# Patient Record
Sex: FEMALE | Race: BLACK OR AFRICAN AMERICAN | Employment: OTHER | URBAN - METROPOLITAN AREA
[De-identification: names, ages, dates, MRNs, and addresses within clinical notes are randomized per-mention and may not be internally consistent; named-entity substitution may affect disease eponyms.]

---

## 2018-09-01 ENCOUNTER — APPOINTMENT (OUTPATIENT)
Dept: CT IMAGING | Age: 53
End: 2018-09-01
Payer: MEDICARE

## 2018-09-01 ENCOUNTER — HOSPITAL ENCOUNTER (EMERGENCY)
Age: 53
Discharge: HOME | End: 2018-09-01
Attending: EMERGENCY MEDICINE
Payer: MEDICARE

## 2018-09-01 VITALS
TEMPERATURE: 97.3 F | BODY MASS INDEX: 38.98 KG/M2 | HEIGHT: 63 IN | HEART RATE: 93 BPM | SYSTOLIC BLOOD PRESSURE: 128 MMHG | WEIGHT: 220 LBS | DIASTOLIC BLOOD PRESSURE: 88 MMHG | OXYGEN SATURATION: 94 % | RESPIRATION RATE: 17 BRPM

## 2018-09-01 DIAGNOSIS — J01.10 ACUTE NON-RECURRENT FRONTAL SINUSITIS: ICD-10-CM

## 2018-09-01 DIAGNOSIS — R51.9 ACUTE NONINTRACTABLE HEADACHE, UNSPECIFIED HEADACHE TYPE: Primary | ICD-10-CM

## 2018-09-01 LAB
ABSOLUTE EOS #: 0.52 K/UL (ref 0–0.44)
ABSOLUTE IMMATURE GRANULOCYTE: <0.03 K/UL (ref 0–0.3)
ABSOLUTE LYMPH #: 2.1 K/UL (ref 1.1–3.7)
ABSOLUTE MONO #: 0.47 K/UL (ref 0.1–1.2)
ANION GAP SERPL CALCULATED.3IONS-SCNC: 13 MMOL/L (ref 9–17)
BASOPHILS # BLD: 1 % (ref 0–2)
BASOPHILS ABSOLUTE: 0.05 K/UL (ref 0–0.2)
BUN BLDV-MCNC: 15 MG/DL (ref 6–20)
BUN/CREAT BLD: ABNORMAL (ref 9–20)
CALCIUM SERPL-MCNC: 9 MG/DL (ref 8.6–10.4)
CHLORIDE BLD-SCNC: 101 MMOL/L (ref 98–107)
CO2: 24 MMOL/L (ref 20–31)
CREAT SERPL-MCNC: 0.79 MG/DL (ref 0.5–0.9)
DIFFERENTIAL TYPE: ABNORMAL
EOSINOPHILS RELATIVE PERCENT: 8 % (ref 1–4)
GFR AFRICAN AMERICAN: >60 ML/MIN
GFR NON-AFRICAN AMERICAN: >60 ML/MIN
GFR SERPL CREATININE-BSD FRML MDRD: ABNORMAL ML/MIN/{1.73_M2}
GFR SERPL CREATININE-BSD FRML MDRD: ABNORMAL ML/MIN/{1.73_M2}
GLUCOSE BLD-MCNC: 183 MG/DL (ref 70–99)
HCT VFR BLD CALC: 45.1 % (ref 36.3–47.1)
HEMOGLOBIN: 13.7 G/DL (ref 11.9–15.1)
IMMATURE GRANULOCYTES: 0 %
LYMPHOCYTES # BLD: 31 % (ref 24–43)
MCH RBC QN AUTO: 28.5 PG (ref 25.2–33.5)
MCHC RBC AUTO-ENTMCNC: 30.4 G/DL (ref 28.4–34.8)
MCV RBC AUTO: 93.8 FL (ref 82.6–102.9)
MONOCYTES # BLD: 7 % (ref 3–12)
NRBC AUTOMATED: 0 PER 100 WBC
PDW BLD-RTO: 14.3 % (ref 11.8–14.4)
PLATELET # BLD: 194 K/UL (ref 138–453)
PLATELET ESTIMATE: ABNORMAL
PMV BLD AUTO: 11.9 FL (ref 8.1–13.5)
POTASSIUM SERPL-SCNC: 4.8 MMOL/L (ref 3.7–5.3)
RBC # BLD: 4.81 M/UL (ref 3.95–5.11)
RBC # BLD: ABNORMAL 10*6/UL
SEG NEUTROPHILS: 53 % (ref 36–65)
SEGMENTED NEUTROPHILS ABSOLUTE COUNT: 3.54 K/UL (ref 1.5–8.1)
SODIUM BLD-SCNC: 138 MMOL/L (ref 135–144)
WBC # BLD: 6.7 K/UL (ref 3.5–11.3)
WBC # BLD: ABNORMAL 10*3/UL

## 2018-09-01 PROCEDURE — 70498 CT ANGIOGRAPHY NECK: CPT

## 2018-09-01 PROCEDURE — 80048 BASIC METABOLIC PNL TOTAL CA: CPT

## 2018-09-01 PROCEDURE — 99284 EMERGENCY DEPT VISIT MOD MDM: CPT

## 2018-09-01 PROCEDURE — 6360000002 HC RX W HCPCS: Performed by: STUDENT IN AN ORGANIZED HEALTH CARE EDUCATION/TRAINING PROGRAM

## 2018-09-01 PROCEDURE — 62270 DX LMBR SPI PNXR: CPT

## 2018-09-01 PROCEDURE — 6360000004 HC RX CONTRAST MEDICATION: Performed by: STUDENT IN AN ORGANIZED HEALTH CARE EDUCATION/TRAINING PROGRAM

## 2018-09-01 PROCEDURE — 70496 CT ANGIOGRAPHY HEAD: CPT

## 2018-09-01 PROCEDURE — 6360000002 HC RX W HCPCS: Performed by: EMERGENCY MEDICINE

## 2018-09-01 PROCEDURE — 85025 COMPLETE CBC W/AUTO DIFF WBC: CPT

## 2018-09-01 PROCEDURE — 2580000003 HC RX 258: Performed by: STUDENT IN AN ORGANIZED HEALTH CARE EDUCATION/TRAINING PROGRAM

## 2018-09-01 PROCEDURE — 96374 THER/PROPH/DIAG INJ IV PUSH: CPT

## 2018-09-01 PROCEDURE — 96375 TX/PRO/DX INJ NEW DRUG ADDON: CPT

## 2018-09-01 PROCEDURE — 70450 CT HEAD/BRAIN W/O DYE: CPT

## 2018-09-01 RX ORDER — DEXAMETHASONE SODIUM PHOSPHATE 4 MG/ML
4 INJECTION, SOLUTION INTRA-ARTICULAR; INTRALESIONAL; INTRAMUSCULAR; INTRAVENOUS; SOFT TISSUE ONCE
Status: COMPLETED | OUTPATIENT
Start: 2018-09-01 | End: 2018-09-01

## 2018-09-01 RX ORDER — SPIRONOLACTONE 25 MG/1
25 TABLET ORAL DAILY
COMMUNITY

## 2018-09-01 RX ORDER — ISOSORBIDE MONONITRATE 60 MG/1
60 TABLET, EXTENDED RELEASE ORAL DAILY
COMMUNITY

## 2018-09-01 RX ORDER — BENZONATATE 100 MG/1
100 CAPSULE ORAL 3 TIMES DAILY PRN
Qty: 30 CAPSULE | Refills: 0 | Status: SHIPPED | OUTPATIENT
Start: 2018-09-01 | End: 2018-09-08

## 2018-09-01 RX ORDER — 0.9 % SODIUM CHLORIDE 0.9 %
1000 INTRAVENOUS SOLUTION INTRAVENOUS ONCE
Status: COMPLETED | OUTPATIENT
Start: 2018-09-01 | End: 2018-09-01

## 2018-09-01 RX ORDER — HYDRALAZINE HYDROCHLORIDE 25 MG/1
25 TABLET, FILM COATED ORAL 3 TIMES DAILY
COMMUNITY

## 2018-09-01 RX ORDER — DIPHENHYDRAMINE HYDROCHLORIDE 50 MG/ML
25 INJECTION INTRAMUSCULAR; INTRAVENOUS ONCE
Status: COMPLETED | OUTPATIENT
Start: 2018-09-01 | End: 2018-09-01

## 2018-09-01 RX ORDER — DEXAMETHASONE SODIUM PHOSPHATE 10 MG/ML
8 INJECTION INTRAMUSCULAR; INTRAVENOUS ONCE
Status: DISCONTINUED | OUTPATIENT
Start: 2018-09-01 | End: 2018-09-01

## 2018-09-01 RX ORDER — POTASSIUM CHLORIDE 750 MG/1
10 TABLET, FILM COATED, EXTENDED RELEASE ORAL DAILY
COMMUNITY

## 2018-09-01 RX ORDER — HYDRALAZINE HYDROCHLORIDE 20 MG/ML
10 INJECTION INTRAMUSCULAR; INTRAVENOUS ONCE
Status: DISCONTINUED | OUTPATIENT
Start: 2018-09-01 | End: 2018-09-01 | Stop reason: HOSPADM

## 2018-09-01 RX ORDER — FLUTICASONE PROPIONATE 50 MCG
1 SPRAY, SUSPENSION (ML) NASAL DAILY
Qty: 1 BOTTLE | Refills: 0 | Status: SHIPPED | OUTPATIENT
Start: 2018-09-01

## 2018-09-01 RX ORDER — LABETALOL HYDROCHLORIDE 5 MG/ML
10 INJECTION, SOLUTION INTRAVENOUS ONCE
Status: DISCONTINUED | OUTPATIENT
Start: 2018-09-01 | End: 2018-09-01 | Stop reason: HOSPADM

## 2018-09-01 RX ORDER — GUAIFENESIN 600 MG/1
600 TABLET, EXTENDED RELEASE ORAL 2 TIMES DAILY
Qty: 20 TABLET | Refills: 0 | Status: SHIPPED | OUTPATIENT
Start: 2018-09-01 | End: 2020-01-16

## 2018-09-01 RX ADMIN — IOPAMIDOL 90 ML: 755 INJECTION, SOLUTION INTRAVENOUS at 17:31

## 2018-09-01 RX ADMIN — PROCHLORPERAZINE EDISYLATE 10 MG: 5 INJECTION INTRAMUSCULAR; INTRAVENOUS at 13:15

## 2018-09-01 RX ADMIN — DEXAMETHASONE SODIUM PHOSPHATE 4 MG: 4 INJECTION, SOLUTION INTRAMUSCULAR; INTRAVENOUS at 13:14

## 2018-09-01 RX ADMIN — SODIUM CHLORIDE 1000 ML: 9 INJECTION, SOLUTION INTRAVENOUS at 13:13

## 2018-09-01 RX ADMIN — DIPHENHYDRAMINE HYDROCHLORIDE 25 MG: 50 INJECTION, SOLUTION INTRAMUSCULAR; INTRAVENOUS at 13:14

## 2018-09-01 RX ADMIN — DEXAMETHASONE SODIUM PHOSPHATE 4 MG: 4 INJECTION, SOLUTION INTRAMUSCULAR; INTRAVENOUS at 13:13

## 2018-09-01 ASSESSMENT — PAIN DESCRIPTION - PROGRESSION: CLINICAL_PROGRESSION: NOT CHANGED

## 2018-09-01 ASSESSMENT — ENCOUNTER SYMPTOMS
NAUSEA: 1
SHORTNESS OF BREATH: 0
SINUS PAIN: 0
PHOTOPHOBIA: 0
VOMITING: 1
WHEEZING: 0
SORE THROAT: 0
COUGH: 1
DIARRHEA: 0
EYE PAIN: 0
ABDOMINAL PAIN: 0
CONSTIPATION: 0

## 2018-09-01 ASSESSMENT — PAIN DESCRIPTION - PAIN TYPE: TYPE: ACUTE PAIN

## 2018-09-01 ASSESSMENT — PAIN DESCRIPTION - DESCRIPTORS: DESCRIPTORS: HEADACHE

## 2018-09-01 ASSESSMENT — PAIN DESCRIPTION - LOCATION: LOCATION: HEAD

## 2018-09-01 ASSESSMENT — PAIN SCALES - GENERAL: PAINLEVEL_OUTOF10: 10

## 2018-09-01 ASSESSMENT — PAIN DESCRIPTION - ONSET: ONSET: ON-GOING

## 2018-09-01 ASSESSMENT — PAIN DESCRIPTION - FREQUENCY: FREQUENCY: CONTINUOUS

## 2018-09-01 NOTE — ED PROVIDER NOTES
5)      INITIAL VITALS:   /88   Pulse 93   Temp 97.3 °F (36.3 °C) (Oral)   Resp 17   Ht 5' 3\" (1.6 m)   Wt 220 lb (99.8 kg)   LMP 12/11/2014 (Approximate)   SpO2 94%   BMI 38.97 kg/m²     Physical Exam   Constitutional: She is oriented to person, place, and time. She appears well-developed and well-nourished. No distress. HENT:   Head: Normocephalic and atraumatic. Eyes: Pupils are equal, round, and reactive to light. Conjunctivae and EOM are normal.   Cardiovascular: Normal rate, regular rhythm, normal heart sounds and intact distal pulses. Pulmonary/Chest: Effort normal and breath sounds normal. No respiratory distress. She has no wheezes. Abdominal: Soft. Bowel sounds are normal. There is no tenderness. There is no rebound. Neurological: She is alert and oriented to person, place, and time. No cranial nerve deficit. She exhibits normal muscle tone. Coordination normal.   Skin: Skin is warm and dry. She is not diaphoretic. Psychiatric: She has a normal mood and affect. Thought content normal.   Vitals reviewed.       DIFFERENTIAL  DIAGNOSIS     PLAN (LABS / IMAGING / EKG):  Orders Placed This Encounter   Procedures    LUMBAR PUNCTURE    CT Head WO Contrast    CTA HEAD W CONTRAST    CTA NECK W CONTRAST    CBC Auto Differential    Basic Metabolic Panel    Insert peripheral IV       MEDICATIONS ORDERED:  Orders Placed This Encounter   Medications    prochlorperazine (COMPAZINE) injection 10 mg    diphenhydrAMINE (BENADRYL) injection 25 mg    DISCONTD: dexamethasone (DECADRON) injection 8 mg    0.9 % sodium chloride bolus    hydrALAZINE (APRESOLINE) injection 10 mg    dexamethasone (DECADRON) injection 4 mg    dexamethasone (DECADRON) injection 4 mg    labetalol (NORMODYNE;TRANDATE) injection 10 mg    iopamidol (ISOVUE-370) 76 % injection 90 mL       DDX: SAH, subdural hematoma, epidural, intracerebral, meningitis, encephalitis, migraine, tension, cluster, sinusitis, dental, stroke, encephalitis, abscess, CNS mass, increased ICP, venous thrombosis, carbon monoxide poisoning, acute angle closure glaucoma, temporal arteritis, idiopathic intracranial hypertension, sinusitis     DIAGNOSTIC RESULTS / EMERGENCY DEPARTMENT COURSE / MDM     LABS:  Results for orders placed or performed during the hospital encounter of 09/01/18   CBC Auto Differential   Result Value Ref Range    WBC 6.7 3.5 - 11.3 k/uL    RBC 4.81 3.95 - 5.11 m/uL    Hemoglobin 13.7 11.9 - 15.1 g/dL    Hematocrit 45.1 36.3 - 47.1 %    MCV 93.8 82.6 - 102.9 fL    MCH 28.5 25.2 - 33.5 pg    MCHC 30.4 28.4 - 34.8 g/dL    RDW 14.3 11.8 - 14.4 %    Platelets 602 591 - 914 k/uL    MPV 11.9 8.1 - 13.5 fL    NRBC Automated 0.0 0.0 per 100 WBC    Differential Type NOT REPORTED     Seg Neutrophils 53 36 - 65 %    Lymphocytes 31 24 - 43 %    Monocytes 7 3 - 12 %    Eosinophils % 8 (H) 1 - 4 %    Basophils 1 0 - 2 %    Immature Granulocytes 0 0 %    Segs Absolute 3.54 1.50 - 8.10 k/uL    Absolute Lymph # 2.10 1.10 - 3.70 k/uL    Absolute Mono # 0.47 0.10 - 1.20 k/uL    Absolute Eos # 0.52 (H) 0.00 - 0.44 k/uL    Basophils # 0.05 0.00 - 0.20 k/uL    Absolute Immature Granulocyte <0.03 0.00 - 0.30 k/uL    WBC Morphology NOT REPORTED     RBC Morphology NOT REPORTED     Platelet Estimate NOT REPORTED    Basic Metabolic Panel   Result Value Ref Range    Glucose 183 (H) 70 - 99 mg/dL    BUN 15 6 - 20 mg/dL    CREATININE 0.79 0.50 - 0.90 mg/dL    Bun/Cre Ratio NOT REPORTED 9 - 20    Calcium 9.0 8.6 - 10.4 mg/dL    Sodium 138 135 - 144 mmol/L    Potassium 4.8 3.7 - 5.3 mmol/L    Chloride 101 98 - 107 mmol/L    CO2 24 20 - 31 mmol/L    Anion Gap 13 9 - 17 mmol/L    GFR Non-African American >60 >60 mL/min    GFR African American >60 >60 mL/min    GFR Comment          GFR Staging NOT REPORTED        IMPRESSION:Patient is a 70-year-old female who presents with headache for the past 24 hours.   Patient notes that she has not had a history of headaches and states that this has been gradually getting worse over that time. Patient denies any trauma or injury. Patient was found to be hypertensive on arrival will obtain CBC, BMP, CT head give IV fluids, Decadron, Compazine and Benadryl reevaluate patient. Patient requesting discharge. Patient was given written and verbal instructions prior to discharge. Patient understood and agreed. The patient had no further questions. Pre-hypertension/Hypertension: The patient has been informed that they may have pre-hypertension or Hypertension based on a blood pressure reading in the emergency department. I recommend that the patient call the primary care provider listed on their discharge instructions or a physician of their choice this week to arrange follow up for further evaluation of possible pre-hypertension or Hypertension. Pregnancy test was not ordered because patient's postmenopausal        RADIOLOGY:  Ct Head Wo Contrast    Result Date: 9/1/2018  EXAMINATION: CT OF THE HEAD WITHOUT CONTRAST  9/1/2018 1:24 pm TECHNIQUE: CT of the head was performed without the administration of intravenous contrast. Dose modulation, iterative reconstruction, and/or weight based adjustment of the mA/kV was utilized to reduce the radiation dose to as low as reasonably achievable. COMPARISON: None. HISTORY: ORDERING SYSTEM PROVIDED HISTORY: HEADACHE TECHNOLOGIST PROVIDED HISTORY: FINDINGS: BRAIN/VENTRICLES: There is no acute intracranial hemorrhage, mass effect or midline shift. No abnormal extra-axial fluid collection. The gray-white differentiation is maintained without evidence of an acute infarct. There is no evidence of hydrocephalus. Mild chronic microvascular ischemic changes. ORBITS: The visualized portion of the orbits demonstrate no acute abnormality. SINUSES: Ethmoid, frontal and sphenoid sinus disease. There is opacification of the left mastoid air cells. No bony destruction is seen.  SOFT TISSUES/SKULL:  No normal branch pattern of the aortic arch. No significant stenosis is seen of the innominate artery or subclavian arteries. CAROTID ARTERIES: The common carotid arteries are normal in appearance. The internal carotid arteries are normal without significant stenosis or dissection evident. VERTEBRAL ARTERIES: The vertebral arteries are normal in appearance. There is no significant stenosis or dissection. SOFT TISSUES: The lung apices are clear. There is no pathologically enlarged lymphadenopathy. No soft tissue mass is identified. BONES: No lytic or blastic osseous lesions are identified. CTA HEAD: ANTERIOR CIRCULATION: The intracranial segments of the internal carotid arteries are normal.  There is no significant stenosis or aneurysm identified. The anterior and middle cerebral arteries are normal in appearance. No significant stenosis or aneurysm is identified. POSTERIOR CIRCULATION: The distal vertebral arteries are normal in appearance. The basilar artery is normal.  No significant stenosis or aneurysm is identified. The posterior cerebral arteries are normal in appearance. A fetal origin of the posterior cerebral arteries is incidentally noted. BRAIN: There is no vascular malformation or abnormal enhancement identified. 3D surface reformations were performed and concur with the above findings. Unremarkable CTA of the head and neck. EKG  None    All EKG's are interpreted by the Emergency Department Physician who either signs or Co-signs this chart in the absence of a cardiologist.    EMERGENCY DEPARTMENT COURSE:    unable to obtain CSF during LP as noted below. Patient noted after reevaluation after LP attempted that her headache has improved and is only present when she is coughing at this time. CTA head and neck ordered to evaluate for intracranial bleed and other intracranial process. If negative will discharge home with antibiotics and nasal spray.     PROCEDURES:  Lumbar Puncture  Date/Time: 9/1/2018 4:33 PM  Performed by: Clau Henriquez  Authorized by: Lazaro Robbins     Consent:     Consent obtained:  Written    Consent given by:  Patient    Risks discussed:  Bleeding, infection, nerve damage, headache, repeat procedure and pain    Alternatives discussed:  No treatment  Pre-procedure details:     Procedure purpose:  Diagnostic    Preparation: Patient was prepped and draped in usual sterile fashion    Anesthesia (see MAR for exact dosages): Anesthesia method:  Local infiltration    Local anesthetic:  Lidocaine 1% WITH epi  Procedure details:     Lumbar space:  L4-L5 interspace    Patient position:  Sitting    Needle gauge:  22    Needle type: Maddie pencil tip    Ultrasound guidance: no      Number of attempts:  4    Total volume (ml):  0  Post-procedure:     Puncture site:  Adhesive bandage applied    Patient tolerance of procedure: Tolerated well, no immediate complications (Patient tolerated the procedure however multiple attempts were performed with no CSF obtained.)  Comments:      Multiple attempts as noted above. CSF was unable to be obtained. Patient had to wait and is placed over the puncture site and will order a CTA to evaluate for subarachnoid hemorrhage. CONSULTS:  None      FINAL IMPRESSION      1. Acute nonintractable headache, unspecified headache type    2. Acute non-recurrent frontal sinusitis          DISPOSITION / PLAN     DISPOSITION Discharge    PATIENT REFERRED TO:  No follow-up provider specified.     DISCHARGE MEDICATIONS:  New Prescriptions    No medications on file       Virginie Szymanski DO  Emergency Medicine Resident    (Please note that portions of this note were completed with a voice recognition program.  Efforts were made to edit the dictations but occasionally words are mis-transcribed.)        Virginie Szymanski DO  Resident  09/01/18 4220

## 2018-09-01 NOTE — ED NOTES
Pt reports feeling \"much better\" pt given water and crackers. Denies any needs at this time.       Albina Petersen RN  09/01/18 2433

## 2018-09-01 NOTE — ED NOTES
Pt to ed from home c/o migraine starting last night 10/10 pain. Pt reports taking a motrin 800 mg this morning without relief. Pt reports frontal lobe headache with nausea.       Shanita Walls RN  09/01/18 1948

## 2018-09-01 NOTE — ED NOTES
[facility]  ED  Emergency Department Encounter  Medical Student     Pt Name: Cody Mccarthy  MRN: [id number]  [place] 1965  Date of evaluation: 9/1/18  PCP:  No primary care provider on file. CHIEF COMPLAINT       Chief Complaint   Patient presents with    Migraine     since last night. sensitive to sound and light. with nausea        HISTORY OF PRESENT ILLNESS  (Location/Symptom, Timing/Onset, Context/Setting, Quality, Duration, Modifying Factors, Severity.)      Cody Mccarthy is a 46 y.o. female with history of HTN who presents with headache that started yesterday while she was sitting on her couch. She says it came on gradually. She describes it as constant, 10/10, and frontal. She tried ibuprofen 800mg for her pain and that didn't help. She has associated nausea and vomited one time. She denies light or sound sensitivity. She denies associated fever, chills, or neck pain. PAST MEDICAL / SURGICAL / SOCIAL / FAMILY HISTORY      has a past medical history of CHF (congestive heart failure) ([facility] [address] Hypertension; NICM (nonischemic cardiomyopathy) ([facility] [address] and Noncompliance. has a past surgical history that includes Cardiac catheterization (2008). Social History     Social History    Marital status: Single     Spouse name: N/A    Number of children: 3    Years of education: N/A     Occupational History    Not on file. Social History Main Topics    Smoking status: Former Smoker     Packs/day: 1.50    Smokeless tobacco: Never Used    Alcohol use No    Drug use: No    Sexual activity: Not on file     Other Topics Concern    Not on file     Social History Narrative    No narrative on file       Family History   Problem Relation Age of Onset    Other Sister         hole in heart       Allergies:  Patient has no known allergies. Home Medications:  Prior to Admission medications    Medication Sig Start Date End Date Taking?  Authorizing Provider   hydrALAZINE (APRESOLINE) 25 MG tablet Take 25 mg by mouth 3 times daily   Yes Historical Provider, MD   potassium chloride (KLOR-CON) 10 MEQ extended release tablet Take 10 mEq by mouth daily   Yes Historical Provider, MD   spironolactone (ALDACTONE) 25 MG tablet Take 25 mg by mouth daily   Yes Historical Provider, MD   isosorbide mononitrate (IMDUR) 60 MG extended release tablet Take 60 mg by mouth daily   Yes Historical Provider, MD   aspirin 81 MG chewable tablet Take 81 mg by mouth daily. Yes Historical Provider, MD   pravastatin (PRAVACHOL) 10 MG tablet Take 10 mg by mouth daily. Yes Historical Provider, MD   carvedilol (COREG) 25 MG tablet Take 25 mg by mouth 2 times daily (with meals). Yes Historical Provider, MD   furosemide (LASIX) 40 MG tablet Take 40 mg by mouth 2 times daily. Yes Historical Provider, MD   ferrous sulfate 325 (65 FE) MG tablet Take 325 mg by mouth daily (with breakfast). Historical Provider, MD   lisinopril (PRINIVIL;ZESTRIL) 40 MG tablet Take 20 mg by mouth nightly At 8:00pm     Historical Provider, MD       REVIEW OF SYSTEMS    (2-9 systems for level 4, 10 or more for level 5)      Review of Systems   Constitutional: Negative for chills and fever. HENT: Negative for ear pain, sinus pain and sore throat. Eyes: Negative for photophobia, pain and visual disturbance. Respiratory: Negative for shortness of breath. Cardiovascular: Negative for chest pain. Gastrointestinal: Positive for nausea and vomiting. Negative for abdominal pain. Neurological: Positive for headaches. Negative for dizziness, speech difficulty, weakness, light-headedness and numbness.        PHYSICAL EXAM   (up to 7 for level 4, 8 or more for level 5)      INITIAL VITALS:   BP (!) 157/111   Pulse 93   Temp 97.3 °F (36.3 °C) (Oral)   Resp 17   Ht 5' 3\" (1.6 m)   Wt 220 lb (99.8 kg)   LMP 12/11/2014 (Approximate)   SpO2 97%   BMI 38.97 kg/m²     Physical Exam    DIFFERENTIAL  DIAGNOSIS     PLAN (LABS /

## 2019-09-24 ENCOUNTER — HOSPITAL ENCOUNTER (EMERGENCY)
Age: 54
Discharge: HOME | End: 2019-09-24
Attending: EMERGENCY MEDICINE
Payer: MEDICARE

## 2019-09-24 VITALS
DIASTOLIC BLOOD PRESSURE: 77 MMHG | HEIGHT: 63 IN | HEART RATE: 70 BPM | BODY MASS INDEX: 36.68 KG/M2 | WEIGHT: 207 LBS | RESPIRATION RATE: 16 BRPM | SYSTOLIC BLOOD PRESSURE: 114 MMHG | TEMPERATURE: 96.6 F | OXYGEN SATURATION: 98 %

## 2019-09-24 DIAGNOSIS — H57.89 EYE IRRITATION: Primary | ICD-10-CM

## 2019-09-24 DIAGNOSIS — S05.01XA ABRASION OF RIGHT CORNEA, INITIAL ENCOUNTER: ICD-10-CM

## 2019-09-24 PROCEDURE — 2500000003 HC RX 250 WO HCPCS

## 2019-09-24 PROCEDURE — 6370000000 HC RX 637 (ALT 250 FOR IP): Performed by: PHYSICIAN ASSISTANT

## 2019-09-24 PROCEDURE — 99283 EMERGENCY DEPT VISIT LOW MDM: CPT

## 2019-09-24 RX ORDER — PROPARACAINE HYDROCHLORIDE 5 MG/ML
SOLUTION/ DROPS OPHTHALMIC
Status: COMPLETED
Start: 2019-09-24 | End: 2019-09-24

## 2019-09-24 RX ORDER — PROPARACAINE HYDROCHLORIDE 5 MG/ML
1 SOLUTION/ DROPS OPHTHALMIC ONCE
Status: COMPLETED | OUTPATIENT
Start: 2019-09-24 | End: 2019-09-24

## 2019-09-24 RX ORDER — POLYMYXIN B SULFATE AND TRIMETHOPRIM 1; 10000 MG/ML; [USP'U]/ML
1 SOLUTION OPHTHALMIC EVERY 4 HOURS
Qty: 1 BOTTLE | Refills: 0 | Status: SHIPPED | OUTPATIENT
Start: 2019-09-24 | End: 2019-10-04

## 2019-09-24 RX ADMIN — PROPARACAINE HYDROCHLORIDE 1 DROP: 5 SOLUTION/ DROPS OPHTHALMIC at 15:43

## 2019-09-24 RX ADMIN — FLUORESCEIN SODIUM 1 MG: 0.6 STRIP OPHTHALMIC at 15:43

## 2019-09-24 ASSESSMENT — ENCOUNTER SYMPTOMS
SORE THROAT: 0
PHOTOPHOBIA: 0
ABDOMINAL PAIN: 0
NAUSEA: 0
BACK PAIN: 0
SHORTNESS OF BREATH: 0
EYE PAIN: 0
EYE ITCHING: 0
EYE REDNESS: 0
COLOR CHANGE: 0
VOMITING: 0
COUGH: 0
EYE DISCHARGE: 0
DIARRHEA: 0

## 2019-09-24 ASSESSMENT — PAIN DESCRIPTION - FREQUENCY: FREQUENCY: CONTINUOUS

## 2019-09-24 ASSESSMENT — PAIN DESCRIPTION - DESCRIPTORS: DESCRIPTORS: BURNING

## 2019-09-24 ASSESSMENT — VISUAL ACUITY
OS: 20/100+1
OU: 20/50-1
OD: 20/50

## 2019-09-24 ASSESSMENT — PAIN DESCRIPTION - ORIENTATION: ORIENTATION: RIGHT

## 2019-09-24 ASSESSMENT — PAIN DESCRIPTION - LOCATION: LOCATION: EYE

## 2019-09-24 ASSESSMENT — PAIN DESCRIPTION - PAIN TYPE: TYPE: ACUTE PAIN

## 2019-09-24 NOTE — ED PROVIDER NOTES
8 or more for level 5)     Vitals:    09/24/19 1541   BP: 114/77   Pulse: 70   Resp: 16   Temp: 96.6 °F (35.9 °C)   TempSrc: Oral   SpO2: 98%   Weight: 207 lb (93.9 kg)   Height: 5' 3\" (1.6 m)       Physical Exam   Constitutional: She is oriented to person, place, and time. She appears well-developed and well-nourished. No distress. HENT:   Head: Normocephalic and atraumatic. Right Ear: External ear normal.   Left Ear: External ear normal.   Nose: Nose normal.   Mouth/Throat: Oropharynx is clear and moist. No oropharyngeal exudate. Eyes: Pupils are equal, round, and reactive to light. Conjunctivae, EOM and lids are normal. Lids are everted and swept, no foreign bodies found. Right eye exhibits no chemosis, no discharge, no exudate and no hordeolum. No foreign body present in the right eye. Left eye exhibits no chemosis, no discharge, no exudate and no hordeolum. No foreign body present in the left eye. Right conjunctiva is not injected. Right conjunctiva has no hemorrhage. Left conjunctiva is not injected. Left conjunctiva has no hemorrhage. No scleral icterus. Right eye exhibits normal extraocular motion and no nystagmus. Left eye exhibits normal extraocular motion and no nystagmus. Right pupil is round and reactive. Left pupil is round and reactive. Pupils are equal.   Slit lamp exam:       The right eye shows corneal abrasion. The right eye shows no corneal flare, no corneal ulcer, no foreign body, no hyphema, no hypopyon, no fluorescein uptake and no anterior chamber bulge. Pupils are round and reactive to light. pH is normal.  Visual acuity is normal.  No Sarah sign. No eye entrapment. Mild corneal abrasion. Eye pressure is 12. No cell or flare. No fluorescein uptake. Neck: Normal range of motion. Neck supple. No meningeal signs. Cardiovascular: Normal rate, regular rhythm, normal heart sounds and intact distal pulses. Exam reveals no gallop and no friction rub.    No murmur heard.  Pulmonary/Chest: Effort normal and breath sounds normal. No stridor. No respiratory distress. She has no wheezes. She has no rales. Abdominal: Soft. Bowel sounds are normal. She exhibits no distension and no mass. There is no tenderness. There is no rebound and no guarding. No hernia. No peritoneal signs. Musculoskeletal: Normal range of motion. Neurological: She is alert and oriented to person, place, and time. No cranial nerve deficit. Skin: Skin is warm. Capillary refill takes less than 2 seconds. She is not diaphoretic. Psychiatric: She has a normal mood and affect. Her behavior is normal. Judgment and thought content normal.   Nursing note and vitals reviewed. DIAGNOSTIC RESULTS       No orders to display         LABS:  Labs Reviewed - No data to display        EMERGENCY DEPARTMENT COURSE and DIFFERENTIAL DIAGNOSIS/MDM:   Vitals:    Vitals:    09/24/19 1541   BP: 114/77   Pulse: 70   Resp: 16   Temp: 96.6 °F (35.9 °C)   TempSrc: Oral   SpO2: 98%   Weight: 207 lb (93.9 kg)   Height: 5' 3\" (1.6 m)       This is a 66-year-old female presenting to the emergency department complaining of right eye irritation. We will flush the eye per patients request.  While we were evaluating with the slit-lamp we did notice a small corneal abrasion and the proparacaine did relieve the patient's symptoms. We will place her on Polytrim at this time and she was told to take this as prescribed and as directed and all the way through to completion and follow-up with ophthalmology in 3 days. She should return for any worsening symptoms. Vital signs are stable and there is no red flag symptoms. Patient understood and will comply. Patient is satisfied. All questions answered. Attending physician, myself, and patient agree no further workup is necessary at this time. Patient was given very strict return protocols and is completely agreeable with this plan.       This patient was seen by the attending

## 2019-09-24 NOTE — ED NOTES
Patient to ed with c/o foreign body sensation and burning pain to right eye, worse when her eye lid is shut. Patient denies any injury to right eye. Patient denies any diabetic retinopathy or glaucoma. Patient denies any visual disturbances. Patient states she has been seen at two hospitals and sent home on ATB eye drops that she has been using. Patient states she had this pain once before and it went away after eye was flushed here in ER.       Leena Longoria RN  09/24/19 9449

## 2020-01-16 ENCOUNTER — HOSPITAL ENCOUNTER (INPATIENT)
Age: 55
LOS: 2 days | Discharge: HOME | DRG: 293 | End: 2020-01-18
Attending: EMERGENCY MEDICINE | Admitting: INTERNAL MEDICINE
Payer: MEDICARE

## 2020-01-16 ENCOUNTER — APPOINTMENT (OUTPATIENT)
Dept: GENERAL RADIOLOGY | Age: 55
DRG: 293 | End: 2020-01-16
Payer: MEDICARE

## 2020-01-16 ENCOUNTER — APPOINTMENT (OUTPATIENT)
Dept: CT IMAGING | Age: 55
DRG: 293 | End: 2020-01-16
Payer: MEDICARE

## 2020-01-16 PROBLEM — I50.1 PULMONARY EDEMA CARDIAC CAUSE (HCC): Status: ACTIVE | Noted: 2020-01-16

## 2020-01-16 LAB
ABSOLUTE EOS #: 0.1 K/UL (ref 0–0.4)
ABSOLUTE IMMATURE GRANULOCYTE: ABNORMAL K/UL (ref 0–0.3)
ABSOLUTE LYMPH #: 2.4 K/UL (ref 1–4.8)
ABSOLUTE MONO #: 0.7 K/UL (ref 0.1–1.3)
ANION GAP SERPL CALCULATED.3IONS-SCNC: 16 MMOL/L (ref 9–17)
BASOPHILS # BLD: 2 % (ref 0–2)
BASOPHILS ABSOLUTE: 0.1 K/UL (ref 0–0.2)
BNP INTERPRETATION: ABNORMAL
BUN BLDV-MCNC: 11 MG/DL (ref 6–20)
BUN/CREAT BLD: ABNORMAL (ref 9–20)
CALCIUM SERPL-MCNC: 9.2 MG/DL (ref 8.6–10.4)
CHLORIDE BLD-SCNC: 102 MMOL/L (ref 98–107)
CO2: 22 MMOL/L (ref 20–31)
CREAT SERPL-MCNC: 0.87 MG/DL (ref 0.5–0.9)
D-DIMER QUANTITATIVE: 1.21 MG/L FEU (ref 0–0.59)
DIFFERENTIAL TYPE: ABNORMAL
EOSINOPHILS RELATIVE PERCENT: 2 % (ref 0–4)
GFR AFRICAN AMERICAN: >60 ML/MIN
GFR NON-AFRICAN AMERICAN: >60 ML/MIN
GFR SERPL CREATININE-BSD FRML MDRD: ABNORMAL ML/MIN/{1.73_M2}
GFR SERPL CREATININE-BSD FRML MDRD: ABNORMAL ML/MIN/{1.73_M2}
GLUCOSE BLD-MCNC: 158 MG/DL (ref 70–99)
HCT VFR BLD CALC: 45.3 % (ref 36–46)
HEMOGLOBIN: 14.5 G/DL (ref 12–16)
IMMATURE GRANULOCYTES: ABNORMAL %
LYMPHOCYTES # BLD: 33 % (ref 24–44)
MCH RBC QN AUTO: 28.6 PG (ref 26–34)
MCHC RBC AUTO-ENTMCNC: 32.1 G/DL (ref 31–37)
MCV RBC AUTO: 89 FL (ref 80–100)
MONOCYTES # BLD: 9 % (ref 1–7)
NRBC AUTOMATED: ABNORMAL PER 100 WBC
PDW BLD-RTO: 14.7 % (ref 11.5–14.9)
PLATELET # BLD: 279 K/UL (ref 150–450)
PLATELET ESTIMATE: ABNORMAL
PMV BLD AUTO: 9.2 FL (ref 6–12)
POTASSIUM SERPL-SCNC: 3.8 MMOL/L (ref 3.7–5.3)
PRO-BNP: 3556 PG/ML
RBC # BLD: 5.09 M/UL (ref 4–5.2)
RBC # BLD: ABNORMAL 10*6/UL
SEG NEUTROPHILS: 54 % (ref 36–66)
SEGMENTED NEUTROPHILS ABSOLUTE COUNT: 3.9 K/UL (ref 1.3–9.1)
SODIUM BLD-SCNC: 140 MMOL/L (ref 135–144)
TROPONIN INTERP: NORMAL
TROPONIN INTERP: NORMAL
TROPONIN T: NORMAL NG/ML
TROPONIN T: NORMAL NG/ML
TROPONIN, HIGH SENSITIVITY: 10 NG/L (ref 0–14)
TROPONIN, HIGH SENSITIVITY: 11 NG/L (ref 0–14)
WBC # BLD: 7.2 K/UL (ref 3.5–11)
WBC # BLD: ABNORMAL 10*3/UL

## 2020-01-16 PROCEDURE — 2580000003 HC RX 258: Performed by: INTERNAL MEDICINE

## 2020-01-16 PROCEDURE — 80048 BASIC METABOLIC PNL TOTAL CA: CPT

## 2020-01-16 PROCEDURE — 85025 COMPLETE CBC W/AUTO DIFF WBC: CPT

## 2020-01-16 PROCEDURE — 6360000004 HC RX CONTRAST MEDICATION: Performed by: EMERGENCY MEDICINE

## 2020-01-16 PROCEDURE — 93005 ELECTROCARDIOGRAM TRACING: CPT | Performed by: EMERGENCY MEDICINE

## 2020-01-16 PROCEDURE — 85379 FIBRIN DEGRADATION QUANT: CPT

## 2020-01-16 PROCEDURE — 96365 THER/PROPH/DIAG IV INF INIT: CPT

## 2020-01-16 PROCEDURE — 87804 INFLUENZA ASSAY W/OPTIC: CPT

## 2020-01-16 PROCEDURE — 84484 ASSAY OF TROPONIN QUANT: CPT

## 2020-01-16 PROCEDURE — 2060000000 HC ICU INTERMEDIATE R&B

## 2020-01-16 PROCEDURE — 96375 TX/PRO/DX INJ NEW DRUG ADDON: CPT

## 2020-01-16 PROCEDURE — 99285 EMERGENCY DEPT VISIT HI MDM: CPT

## 2020-01-16 PROCEDURE — 6370000000 HC RX 637 (ALT 250 FOR IP): Performed by: EMERGENCY MEDICINE

## 2020-01-16 PROCEDURE — 71260 CT THORAX DX C+: CPT

## 2020-01-16 PROCEDURE — 70450 CT HEAD/BRAIN W/O DYE: CPT

## 2020-01-16 PROCEDURE — 71046 X-RAY EXAM CHEST 2 VIEWS: CPT

## 2020-01-16 PROCEDURE — 2580000003 HC RX 258: Performed by: EMERGENCY MEDICINE

## 2020-01-16 PROCEDURE — 36415 COLL VENOUS BLD VENIPUNCTURE: CPT

## 2020-01-16 PROCEDURE — 83880 ASSAY OF NATRIURETIC PEPTIDE: CPT

## 2020-01-16 PROCEDURE — 6360000002 HC RX W HCPCS: Performed by: EMERGENCY MEDICINE

## 2020-01-16 RX ORDER — SODIUM CHLORIDE 0.9 % (FLUSH) 0.9 %
10 SYRINGE (ML) INJECTION PRN
Status: DISCONTINUED | OUTPATIENT
Start: 2020-01-16 | End: 2020-01-18 | Stop reason: HOSPADM

## 2020-01-16 RX ORDER — PRAVASTATIN SODIUM 10 MG
10 TABLET ORAL DAILY
Status: DISCONTINUED | OUTPATIENT
Start: 2020-01-17 | End: 2020-01-18 | Stop reason: HOSPADM

## 2020-01-16 RX ORDER — SPIRONOLACTONE 25 MG/1
25 TABLET ORAL DAILY
Status: DISCONTINUED | OUTPATIENT
Start: 2020-01-17 | End: 2020-01-18 | Stop reason: HOSPADM

## 2020-01-16 RX ORDER — NITROGLYCERIN 0.4 MG/1
0.4 TABLET SUBLINGUAL EVERY 5 MIN PRN
Status: DISCONTINUED | OUTPATIENT
Start: 2020-01-16 | End: 2020-01-18 | Stop reason: HOSPADM

## 2020-01-16 RX ORDER — ONDANSETRON 2 MG/ML
4 INJECTION INTRAMUSCULAR; INTRAVENOUS EVERY 6 HOURS PRN
Status: DISCONTINUED | OUTPATIENT
Start: 2020-01-16 | End: 2020-01-18 | Stop reason: HOSPADM

## 2020-01-16 RX ORDER — ONDANSETRON 2 MG/ML
4 INJECTION INTRAMUSCULAR; INTRAVENOUS ONCE
Status: COMPLETED | OUTPATIENT
Start: 2020-01-16 | End: 2020-01-16

## 2020-01-16 RX ORDER — FERROUS SULFATE 325(65) MG
325 TABLET ORAL
Status: DISCONTINUED | OUTPATIENT
Start: 2020-01-17 | End: 2020-01-18 | Stop reason: HOSPADM

## 2020-01-16 RX ORDER — FUROSEMIDE 10 MG/ML
40 INJECTION INTRAMUSCULAR; INTRAVENOUS ONCE
Status: COMPLETED | OUTPATIENT
Start: 2020-01-16 | End: 2020-01-16

## 2020-01-16 RX ORDER — POTASSIUM CHLORIDE 750 MG/1
10 CAPSULE, EXTENDED RELEASE ORAL DAILY
Status: DISCONTINUED | OUTPATIENT
Start: 2020-01-17 | End: 2020-01-18 | Stop reason: HOSPADM

## 2020-01-16 RX ORDER — CARVEDILOL 25 MG/1
25 TABLET ORAL 2 TIMES DAILY WITH MEALS
Status: DISCONTINUED | OUTPATIENT
Start: 2020-01-17 | End: 2020-01-18 | Stop reason: HOSPADM

## 2020-01-16 RX ORDER — SODIUM CHLORIDE 0.9 % (FLUSH) 0.9 %
10 SYRINGE (ML) INJECTION EVERY 12 HOURS SCHEDULED
Status: DISCONTINUED | OUTPATIENT
Start: 2020-01-16 | End: 2020-01-18 | Stop reason: HOSPADM

## 2020-01-16 RX ORDER — FUROSEMIDE 10 MG/ML
40 INJECTION INTRAMUSCULAR; INTRAVENOUS DAILY
Status: DISCONTINUED | OUTPATIENT
Start: 2020-01-17 | End: 2020-01-18 | Stop reason: HOSPADM

## 2020-01-16 RX ORDER — NITROGLYCERIN 20 MG/100ML
5 INJECTION INTRAVENOUS CONTINUOUS
Status: DISCONTINUED | OUTPATIENT
Start: 2020-01-16 | End: 2020-01-18 | Stop reason: HOSPADM

## 2020-01-16 RX ORDER — 0.9 % SODIUM CHLORIDE 0.9 %
80 INTRAVENOUS SOLUTION INTRAVENOUS ONCE
Status: COMPLETED | OUTPATIENT
Start: 2020-01-16 | End: 2020-01-16

## 2020-01-16 RX ORDER — HYDRALAZINE HYDROCHLORIDE 25 MG/1
25 TABLET, FILM COATED ORAL 3 TIMES DAILY
Status: DISCONTINUED | OUTPATIENT
Start: 2020-01-17 | End: 2020-01-18 | Stop reason: HOSPADM

## 2020-01-16 RX ORDER — ISOSORBIDE MONONITRATE 60 MG/1
60 TABLET, EXTENDED RELEASE ORAL DAILY
Status: DISCONTINUED | OUTPATIENT
Start: 2020-01-17 | End: 2020-01-18 | Stop reason: HOSPADM

## 2020-01-16 RX ORDER — FLUTICASONE PROPIONATE 50 MCG
1 SPRAY, SUSPENSION (ML) NASAL DAILY
Status: DISCONTINUED | OUTPATIENT
Start: 2020-01-17 | End: 2020-01-18 | Stop reason: HOSPADM

## 2020-01-16 RX ORDER — MORPHINE SULFATE 4 MG/ML
4 INJECTION, SOLUTION INTRAMUSCULAR; INTRAVENOUS ONCE
Status: COMPLETED | OUTPATIENT
Start: 2020-01-16 | End: 2020-01-16

## 2020-01-16 RX ORDER — ASPIRIN 81 MG/1
324 TABLET, CHEWABLE ORAL ONCE
Status: COMPLETED | OUTPATIENT
Start: 2020-01-16 | End: 2020-01-16

## 2020-01-16 RX ORDER — ASPIRIN 81 MG/1
162 TABLET, CHEWABLE ORAL DAILY
Status: DISCONTINUED | OUTPATIENT
Start: 2020-01-17 | End: 2020-01-18 | Stop reason: HOSPADM

## 2020-01-16 RX ADMIN — SODIUM CHLORIDE 80 ML: 9 INJECTION, SOLUTION INTRAVENOUS at 19:19

## 2020-01-16 RX ADMIN — ASPIRIN 81 MG 324 MG: 81 TABLET ORAL at 18:38

## 2020-01-16 RX ADMIN — CEFTRIAXONE SODIUM 1 G: 1 INJECTION, POWDER, FOR SOLUTION INTRAMUSCULAR; INTRAVENOUS at 20:11

## 2020-01-16 RX ADMIN — ONDANSETRON 4 MG: 2 INJECTION INTRAMUSCULAR; INTRAVENOUS at 18:39

## 2020-01-16 RX ADMIN — MORPHINE SULFATE 4 MG: 4 INJECTION, SOLUTION INTRAMUSCULAR; INTRAVENOUS at 18:39

## 2020-01-16 RX ADMIN — NITROGLYCERIN 0.4 MG: 0.4 TABLET SUBLINGUAL at 18:42

## 2020-01-16 RX ADMIN — AZITHROMYCIN MONOHYDRATE 500 MG: 500 INJECTION, POWDER, LYOPHILIZED, FOR SOLUTION INTRAVENOUS at 21:13

## 2020-01-16 RX ADMIN — IOPAMIDOL 75 ML: 755 INJECTION, SOLUTION INTRAVENOUS at 19:18

## 2020-01-16 RX ADMIN — Medication 10 ML: at 19:19

## 2020-01-16 RX ADMIN — Medication 10 ML: at 22:43

## 2020-01-16 RX ADMIN — FUROSEMIDE 40 MG: 10 INJECTION, SOLUTION INTRAMUSCULAR; INTRAVENOUS at 20:11

## 2020-01-16 ASSESSMENT — PAIN SCALES - GENERAL
PAINLEVEL_OUTOF10: 10
PAINLEVEL_OUTOF10: 10
PAINLEVEL_OUTOF10: 2
PAINLEVEL_OUTOF10: 2

## 2020-01-16 ASSESSMENT — ENCOUNTER SYMPTOMS
NAUSEA: 0
COUGH: 0
SORE THROAT: 0
BLOOD IN STOOL: 0
DIARRHEA: 0
VOMITING: 0
ABDOMINAL PAIN: 0
SHORTNESS OF BREATH: 1
COLOR CHANGE: 0
TROUBLE SWALLOWING: 0
BACK PAIN: 0
CONSTIPATION: 0

## 2020-01-16 ASSESSMENT — PAIN DESCRIPTION - LOCATION
LOCATION: HEAD
LOCATION: HEAD

## 2020-01-16 ASSESSMENT — PAIN - FUNCTIONAL ASSESSMENT
PAIN_FUNCTIONAL_ASSESSMENT: ACTIVITIES ARE NOT PREVENTED
PAIN_FUNCTIONAL_ASSESSMENT: ACTIVITIES ARE NOT PREVENTED

## 2020-01-16 NOTE — ED PROVIDER NOTES
[address] ED  eMERGENCY dEPARTMENT eNCOUnter    Pt Name: Jimenez Jauregui  MRN: [id number]  YOB: 1965  Date of evaluation:1/16/20  PCP: Dheeraj Zhang, [facility] [address]       Chief Complaint   Patient presents with    Chest Pain    Headache       HISTORY OF PRESENT ILLNESS    Jimenez Jauregui is a 47 y.o. female who presents with a chief complaint of chest pain and a headache. Patient states that yesterday evening she developed chest pain that is mostly on the left side. It is 10 out of 10 and feels like it is aching. It is worse with exertion. Also feels mildly short of breath but states she has congestive heart failure and does not think it is any significantly worse than usual.  Also developed a frontal headache around that time 2. No blurred or double vision. No cough, congestion, recent illnesses. No fevers that she knows of. No nausea, vomiting, changes in bowel or bladder habits. States she has been taking all medications as prescribed including her antihypertensives. Symptoms are acute. Symptoms are severe. Nothing else make symptoms better or worse. Patient has no other complaints at this time. REVIEW OF SYSTEMS       Review of Systems   Constitutional: Negative for chills, fatigue and fever. HENT: Negative for congestion, ear pain, sore throat and trouble swallowing. Eyes: Negative for visual disturbance. Respiratory: Positive for shortness of breath. Negative for cough. Cardiovascular: Positive for chest pain. Negative for palpitations and leg swelling. Gastrointestinal: Negative for abdominal pain, blood in stool, constipation, diarrhea, nausea and vomiting. Genitourinary: Negative for dysuria and flank pain. Musculoskeletal: Negative for arthralgias, back pain, myalgias and neck pain. Skin: Negative for color change, rash and wound. Neurological: Positive for headaches. Negative for dizziness, weakness, light-headedness and numbness. TempSrc:   Oral    SpO2: 98% 97% 94% 98%   Weight:       Height:         7:47 PM  Patient's initial troponin is negative. D-dimer elevated so I did get CT scan the chest.  Patient does have evidence of pulmonary edema. She is not any respiratory distress at this time however on reevaluation her oxygen saturation was 82 to 83% with a good waveform. Again not in any respiratory distress, not tachypneic, protecting her airway. I did place the patient on 4 L of nasal cannula oxygen at this time. I think she probably has a congestive heart failure exacerbation. She does have a BNP elevated to 3500. I spoke about the case with Dr. Lynwood Dancer covering for her cardiologist Dr. Beverly Eduardo. He suggested following up on her second troponin and if it is not elevated admitting her here with a consult to Dr. Kobi Nichole. I am also going to start the patient on a nitroglycerin infusion, give 1 dose of IV Lasix at this time. Patient still confirms that she has been taking her antihypertensives and her diuretics as prescribed. 8:40 PM  Patient's chest pain actually improved after sublingual nitroglycerin. We did not need to start nitroglycerin infusion. Blood pressure now 137/104.      9:37 PM  Second troponin is not elevated. Patient is okay for admission for CHF exacerbation. I spoke with Dr. Farhana Dover patient for admission. Patient does have bronchial thickening on her CT scan. No obvious pneumonia however with her past medical history, desaturation here I am going to start antibiotics to cover for community-acquired pneumonia. CRITICALCARE:      CONSULTS:  IP CONSULT TO CARDIOLOGY  IP CONSULT TO INTERNAL MEDICINE  IP CONSULT TO CARDIOLOGY      PROCEDURES:      FINAL IMPRESSION      1. Acute on chronic congestive heart failure, unspecified heart failure type ([address]    2. Bronchitis    3.  Chest pain, unspecified type            DISPOSITION/PLAN   DISPOSITION      Admission      PATIENT REFERRED TO:  Jazmine Conway

## 2020-01-17 LAB
ABSOLUTE EOS #: 0.1 K/UL (ref 0–0.4)
ABSOLUTE IMMATURE GRANULOCYTE: ABNORMAL K/UL (ref 0–0.3)
ABSOLUTE LYMPH #: 1.1 K/UL (ref 1–4.8)
ABSOLUTE MONO #: 0.5 K/UL (ref 0.1–1.3)
ANION GAP SERPL CALCULATED.3IONS-SCNC: 10 MMOL/L (ref 9–17)
BASOPHILS # BLD: 0 % (ref 0–2)
BASOPHILS ABSOLUTE: 0 K/UL (ref 0–0.2)
BUN BLDV-MCNC: 11 MG/DL (ref 6–20)
BUN/CREAT BLD: ABNORMAL (ref 9–20)
CALCIUM SERPL-MCNC: 9 MG/DL (ref 8.6–10.4)
CHLORIDE BLD-SCNC: 103 MMOL/L (ref 98–107)
CO2: 27 MMOL/L (ref 20–31)
CREAT SERPL-MCNC: 0.77 MG/DL (ref 0.5–0.9)
DIFFERENTIAL TYPE: ABNORMAL
EKG ATRIAL RATE: 99 BPM
EKG P AXIS: 75 DEGREES
EKG P-R INTERVAL: 160 MS
EKG Q-T INTERVAL: 396 MS
EKG QRS DURATION: 118 MS
EKG QTC CALCULATION (BAZETT): 508 MS
EKG R AXIS: -20 DEGREES
EKG T AXIS: 90 DEGREES
EKG VENTRICULAR RATE: 99 BPM
EOSINOPHILS RELATIVE PERCENT: 1 % (ref 0–4)
GFR AFRICAN AMERICAN: >60 ML/MIN
GFR NON-AFRICAN AMERICAN: >60 ML/MIN
GFR SERPL CREATININE-BSD FRML MDRD: ABNORMAL ML/MIN/{1.73_M2}
GFR SERPL CREATININE-BSD FRML MDRD: ABNORMAL ML/MIN/{1.73_M2}
GLUCOSE BLD-MCNC: 106 MG/DL (ref 70–99)
HCT VFR BLD CALC: 40.9 % (ref 36–46)
HEMOGLOBIN: 13.4 G/DL (ref 12–16)
IMMATURE GRANULOCYTES: ABNORMAL %
LYMPHOCYTES # BLD: 21 % (ref 24–44)
MCH RBC QN AUTO: 29 PG (ref 26–34)
MCHC RBC AUTO-ENTMCNC: 32.7 G/DL (ref 31–37)
MCV RBC AUTO: 89 FL (ref 80–100)
MONOCYTES # BLD: 9 % (ref 1–7)
NRBC AUTOMATED: ABNORMAL PER 100 WBC
PDW BLD-RTO: 14.6 % (ref 11.5–14.9)
PLATELET # BLD: 214 K/UL (ref 150–450)
PLATELET ESTIMATE: ABNORMAL
PMV BLD AUTO: 8.9 FL (ref 6–12)
POTASSIUM SERPL-SCNC: 3.9 MMOL/L (ref 3.7–5.3)
RBC # BLD: 4.6 M/UL (ref 4–5.2)
RBC # BLD: ABNORMAL 10*6/UL
SEG NEUTROPHILS: 69 % (ref 36–66)
SEGMENTED NEUTROPHILS ABSOLUTE COUNT: 3.6 K/UL (ref 1.3–9.1)
SODIUM BLD-SCNC: 140 MMOL/L (ref 135–144)
WBC # BLD: 5.3 K/UL (ref 3.5–11)
WBC # BLD: ABNORMAL 10*3/UL

## 2020-01-17 PROCEDURE — 85025 COMPLETE CBC W/AUTO DIFF WBC: CPT

## 2020-01-17 PROCEDURE — 36415 COLL VENOUS BLD VENIPUNCTURE: CPT

## 2020-01-17 PROCEDURE — 6360000002 HC RX W HCPCS: Performed by: INTERNAL MEDICINE

## 2020-01-17 PROCEDURE — 2060000000 HC ICU INTERMEDIATE R&B

## 2020-01-17 PROCEDURE — 2580000003 HC RX 258: Performed by: INTERNAL MEDICINE

## 2020-01-17 PROCEDURE — 80048 BASIC METABOLIC PNL TOTAL CA: CPT

## 2020-01-17 PROCEDURE — 6370000000 HC RX 637 (ALT 250 FOR IP): Performed by: INTERNAL MEDICINE

## 2020-01-17 RX ORDER — ACETAMINOPHEN 325 MG/1
650 TABLET ORAL EVERY 6 HOURS PRN
Status: DISCONTINUED | OUTPATIENT
Start: 2020-01-17 | End: 2020-01-18 | Stop reason: HOSPADM

## 2020-01-17 RX ADMIN — ENOXAPARIN SODIUM 40 MG: 40 INJECTION SUBCUTANEOUS at 09:09

## 2020-01-17 RX ADMIN — PRAVASTATIN SODIUM 10 MG: 10 TABLET ORAL at 09:10

## 2020-01-17 RX ADMIN — SACUBITRIL AND VALSARTAN 1 TABLET: 49; 51 TABLET, FILM COATED ORAL at 09:15

## 2020-01-17 RX ADMIN — SACUBITRIL AND VALSARTAN 1 TABLET: 49; 51 TABLET, FILM COATED ORAL at 20:35

## 2020-01-17 RX ADMIN — ASPIRIN 81 MG 162 MG: 81 TABLET ORAL at 09:10

## 2020-01-17 RX ADMIN — CEFTRIAXONE SODIUM 1 G: 1 INJECTION, POWDER, FOR SOLUTION INTRAMUSCULAR; INTRAVENOUS at 20:36

## 2020-01-17 RX ADMIN — SPIRONOLACTONE 25 MG: 25 TABLET, FILM COATED ORAL at 09:10

## 2020-01-17 RX ADMIN — Medication 10 ML: at 21:00

## 2020-01-17 RX ADMIN — Medication 10 ML: at 09:19

## 2020-01-17 RX ADMIN — ACETAMINOPHEN 650 MG: 325 TABLET, FILM COATED ORAL at 16:36

## 2020-01-17 RX ADMIN — HYDRALAZINE HYDROCHLORIDE 25 MG: 25 TABLET, FILM COATED ORAL at 14:51

## 2020-01-17 RX ADMIN — POTASSIUM CHLORIDE 10 MEQ: 10 CAPSULE, COATED, EXTENDED RELEASE ORAL at 09:10

## 2020-01-17 RX ADMIN — FLUTICASONE PROPIONATE 1 SPRAY: 50 SPRAY, METERED NASAL at 09:16

## 2020-01-17 RX ADMIN — HYDRALAZINE HYDROCHLORIDE 25 MG: 25 TABLET, FILM COATED ORAL at 09:10

## 2020-01-17 RX ADMIN — CARVEDILOL 25 MG: 25 TABLET, FILM COATED ORAL at 16:41

## 2020-01-17 RX ADMIN — FUROSEMIDE 40 MG: 10 INJECTION, SOLUTION INTRAMUSCULAR; INTRAVENOUS at 09:10

## 2020-01-17 RX ADMIN — CARVEDILOL 25 MG: 25 TABLET, FILM COATED ORAL at 09:10

## 2020-01-17 RX ADMIN — FERROUS SULFATE TAB 325 MG (65 MG ELEMENTAL FE) 325 MG: 325 (65 FE) TAB at 09:10

## 2020-01-17 RX ADMIN — ISOSORBIDE MONONITRATE 60 MG: 60 TABLET, EXTENDED RELEASE ORAL at 09:10

## 2020-01-17 RX ADMIN — HYDRALAZINE HYDROCHLORIDE 25 MG: 25 TABLET, FILM COATED ORAL at 20:35

## 2020-01-17 ASSESSMENT — PAIN SCALES - GENERAL: PAINLEVEL_OUTOF10: 6

## 2020-01-17 NOTE — CARE COORDINATION
CASE MANAGEMENT NOTE:    Admission Date:  1/16/2020 Alden Noriega is a 47 y.o.  female    Admitted for : No admission diagnoses are documented for this encounter. Met with:  Patient    PCP:  Ct Hassan CNP                                Insurance:  [employer]      Current Residence/ Living Arrangements:  independently at home     With son        Current Services PTA:  No    Is patient agreeable to VNS: No    Freedom of choice provided: NA    List of [address] provided: No    VNS chosen:  No    DME:  none    Home Oxygen: No    Nebulizer: No    CPAP/BIPAP: No    Supplier: N/A    Potential Assistance Needed: not expected    SNF needed: No    Pharmacy:  [address] [place] [address]       Is the Patient an VasoNova with Readmission Risk Score greater than 14%? No  If yes, pt needs a follow up appointment made within 7 days. Does Patient want to use MEDS to BEDS? Yes    Family Members/Caregivers that pt would like involved in their care:    Yes    If yes, list name here:  Sister Sherman Selby  Daughter [facility]    Transportation Provider:  Patient    And family         Is patient in Isolation/One on One/Altered Mental Status? No  If yes, skip next question. If no, would they like an I-Pad to  use? No  If yes, call [phone]. Discharge Plan: 1/16/2020  [employer]    DME: none  Pt lives independently at home and drives or has sister pick her up. Pt is interested in Meds to Beds. Pt expects to return home without needs.                Electronically signed by: Rosalee Ulloa RN on 1/16/2020 at 8:49 PM

## 2020-01-17 NOTE — FLOWSHEET NOTE
Patient stated, \"I have a strong sarah and know how he can heal. God is good. \"  As Jessenia Lazaro offered prayer patient offered a blessing. 01/17/20 1413   Encounter Summary   Services provided to: Patient   Referral/Consult From: [facility]   (1-17-20)   Complexity of Encounter Low   Length of Encounter 15 minutes   Spiritual Assessment Completed Yes   Spiritual/Yarsanism   Type Spiritual support   Assessment Calm; Approachable; Hopeful   Intervention Active listening;Prayer;Sustaining presence/ Ministry of presence   Outcome Connection/belonging;Expressed gratitude

## 2020-01-17 NOTE — PROGRESS NOTES
Mimi Sorto is a 47 y.o. female patient.     Current Facility-Administered Medications   Medication Dose Route Frequency Provider Last Rate Last Dose    nitroGLYCERIN (NITROSTAT) SL tablet 0.4 mg  0.4 mg Sublingual Q5 Min PRN [place] Beatriz, DO   0.4 mg at 01/16/20 1842    sodium chloride flush 0.9 % injection 10 mL  10 mL Intravenous PRN [place] Beatriz, DO   10 mL at 01/16/20 1919    nitroGLYCERIN 50 mg in dextrose 5% 250 mL infusion  5 mcg/min Intravenous Continuous Sandi Beatriz, DO   Stopped at 01/16/20 2028    sodium chloride flush 0.9 % injection 10 mL  10 mL Intravenous 2 times per day Lew Vidal MD   10 mL at 01/16/20 2243    sodium chloride flush 0.9 % injection 10 mL  10 mL Intravenous PRN Lew Vidal MD        magnesium hydroxide (MILK OF MAGNESIA) 400 MG/5ML suspension 30 mL  30 mL Oral Daily PRN Lew Vidal MD        ondansetron (ZOFRAN) injection 4 mg  4 mg Intravenous Q6H PRN Lew Vidal MD        enoxaparin (LOVENOX) injection 40 mg  40 mg Subcutaneous Daily Lew Vidal MD        aspirin chewable tablet 162 mg  162 mg Oral Daily Lew Vidal MD        carvedilol (COREG) tablet 25 mg  25 mg Oral BID WC Lew Vidal MD        ferrous sulfate tablet 325 mg  325 mg Oral Daily with breakfast Lew Vidal MD        fluticasone (FLONASE) 50 MCG/ACT nasal spray 1 spray  1 spray Nasal Daily Lew Vidal MD        hydrALAZINE (APRESOLINE) tablet 25 mg  25 mg Oral TID Lew Vidal MD        isosorbide mononitrate (IMDUR) extended release tablet 60 mg  60 mg Oral Daily Lew Vidal MD        potassium chloride (MICRO-K) extended release capsule 10 mEq  10 mEq Oral Daily Lew Vidal MD        pravastatin (PRAVACHOL) tablet 10 mg  10 mg Oral Daily Venuglolis Arcos MD        sacubitril-valsartan (ENTRESTO) 49-51 MG per tablet 1 tablet  1 tablet Oral BID Lew Vidal MD        spironolactone

## 2020-01-17 NOTE — ED NOTES
Pt refusing flu swab at this time. Dr Libia Christian notified. Pt also reporting she is chest pain free and feeling much better. Nitro drip held. Dr Libia Christian notified.       Jorgito Chavez RN  01/16/20 2039

## 2020-01-17 NOTE — CARE COORDINATION
ONGOING DISCHARGE PLAN:    Spoke with patient regarding discharge plan and patient confirms that plan is still to go home with no needs. Wants meds to beds, however if not discharging before 2;00p.m. Saturday will need to change this to [facility]. Remains on Iv Zithromax, Iv rocephin,     Will continue to follow for additional discharge needs.     Electronically signed by Agata Bernard RN on 1/17/2020 at 1:47 PM

## 2020-01-17 NOTE — ED NOTES
Report given to Ghassan Lovelace RN from ER. Report method in person   The following was reviewed with receiving RN:   Current vital signs:  BP (!) 145/98   Pulse 99   Resp 16   Ht 5' 3\" (1.6 m)   Wt 204 lb (92.5 kg)   LMP 12/11/2014 (Approximate)   SpO2 91%   BMI 36.14 kg/m²                      Any medication or safety alerts were reviewed. Any pending diagnostics and notifications were also reviewed, as well as any safety concerns or issues, abnormal labs, abnormal imaging, and abnormal assessment findings. Questions were answered.            Clifton Santacruz RN  01/16/20 8961

## 2020-01-17 NOTE — CONSULTS
Date:   1/17/2020  Patient name: Jimenez Jauregui  Date of admission:  1/16/2020  6:11 PM  MRN:   [id number]  YOB: 1965  PCP: VIVIEN Brooks CNP    Reason for Admission: Pulmonary edema cardiac cause [facility]) [I50.1]    Cardiology consult: Congestive heart failure, accelerated hypertension   Impression    Accelerated hypertension blood pressure 180/120  Hypertension with hypertensive heart disease  Nonischemic cardiomyopathy ejection fraction less than 20%  Status post AICD placement  Overweight  No history of TIA or CVA      History of presenting illness: 19-year-old female with a past medical history of nonischemic cardiomyopathy, hypertension with hypertensive heart disease got admitted with the severe headache, nausea, chest tightness. No AICD shocks, no syncope, no palpitation. No fever or chills, no productive cough. She has been feeling unwell for couple of days. On admission her blood pressure was 171/124 limited of mercury.     Labs on 1/16/2020: Sodium 140, potassium 3.8, BUN 11, creatinine 0.87, glucose 158,  proBNP 3556, high-sensitivity troponin X  WBC 7.2, hemoglobin 14.5, platelets 386, d-dimer 1.2    ECG 1/16/2020 sinus rhythm heart rate 99, VH with repolarization changes, IVCD, left atrial abnormality  Chest x-ray 1/16/2020 cardiomegaly with vascular congestion and mild interstitial pulmonary edema pleural effusion  CTA chest no evidence of pulmonary embolism, diffuse bronchial wall thickening, moderate cardiomegaly    Patient seen and examined and discussed with the patient nurse  At present she is feeling a lot better  Ambulating well  Pressure is stable  CHF compensated    Medications:   Scheduled Meds:   sodium chloride flush  10 mL Intravenous 2 times per day    enoxaparin  40 mg Subcutaneous Daily    aspirin  162 mg Oral Daily    carvedilol  25 mg Oral BID WC    ferrous sulfate  325 mg Oral Daily with breakfast    fluticasone  1 spray Nasal Daily    hydrALAZINE  25 pressure 180/124 headache, nausea, chest tightness  Negative cardiac markers  Chest x-ray showed pulmonary congestion, cardiomegaly  Nonischemic cardiomyopathy ejection fraction less than 20%  Status post AICD    Patient Active Problem List:     Hypertension     Chest pain     Pulmonary edema cardiac cause ([address]      Plan of Treatment:   Medication check continue with beta-blockers, Entresto, diuretics, hydralazine  Ambulate as tolerated    Electronically signed by Kavon Oliver MD on 1/17/2020 at 2:36 PM

## 2020-01-17 NOTE — ED NOTES
Pt O2 sat at 80% RA with good pleth. Pt placed on 4L O2 via NC by Dr Merle Wayne. Pt in no resp. Distress. Pt O2 sat at 95% at this time.       Sonam Dean RN  01/16/20 8159

## 2020-01-17 NOTE — PROGRESS NOTES
Medication History completed:    New medications: Entresto    Medications discontinued: guaifenesin, lisinopril    Changes to dosing:   Aspirin changed to 162 mg (2 tablets) daily    Stated allergies: NKDA    Other pertinent information:Medications confirmed with [facility].      Thank you,  Kaushik Dhaliwal, PharmD, BCPS  [phone]

## 2020-01-18 ENCOUNTER — APPOINTMENT (OUTPATIENT)
Dept: GENERAL RADIOLOGY | Age: 55
DRG: 293 | End: 2020-01-18
Payer: MEDICARE

## 2020-01-18 VITALS
WEIGHT: 209.22 LBS | BODY MASS INDEX: 37.07 KG/M2 | OXYGEN SATURATION: 97 % | HEART RATE: 68 BPM | HEIGHT: 63 IN | TEMPERATURE: 97.5 F | SYSTOLIC BLOOD PRESSURE: 121 MMHG | RESPIRATION RATE: 16 BRPM | DIASTOLIC BLOOD PRESSURE: 81 MMHG

## 2020-01-18 LAB — PROCALCITONIN: 0.06 NG/ML

## 2020-01-18 PROCEDURE — 6370000000 HC RX 637 (ALT 250 FOR IP): Performed by: INTERNAL MEDICINE

## 2020-01-18 PROCEDURE — 84145 PROCALCITONIN (PCT): CPT

## 2020-01-18 PROCEDURE — 6360000002 HC RX W HCPCS: Performed by: INTERNAL MEDICINE

## 2020-01-18 PROCEDURE — 36415 COLL VENOUS BLD VENIPUNCTURE: CPT

## 2020-01-18 PROCEDURE — 71046 X-RAY EXAM CHEST 2 VIEWS: CPT

## 2020-01-18 PROCEDURE — 2580000003 HC RX 258: Performed by: INTERNAL MEDICINE

## 2020-01-18 RX ADMIN — ASPIRIN 81 MG 162 MG: 81 TABLET ORAL at 08:30

## 2020-01-18 RX ADMIN — POTASSIUM CHLORIDE 10 MEQ: 10 CAPSULE, COATED, EXTENDED RELEASE ORAL at 08:30

## 2020-01-18 RX ADMIN — SACUBITRIL AND VALSARTAN 1 TABLET: 49; 51 TABLET, FILM COATED ORAL at 08:30

## 2020-01-18 RX ADMIN — FUROSEMIDE 40 MG: 10 INJECTION, SOLUTION INTRAMUSCULAR; INTRAVENOUS at 08:29

## 2020-01-18 RX ADMIN — CARVEDILOL 25 MG: 25 TABLET, FILM COATED ORAL at 08:30

## 2020-01-18 RX ADMIN — HYDRALAZINE HYDROCHLORIDE 25 MG: 25 TABLET, FILM COATED ORAL at 14:04

## 2020-01-18 RX ADMIN — FLUTICASONE PROPIONATE 1 SPRAY: 50 SPRAY, METERED NASAL at 08:31

## 2020-01-18 RX ADMIN — PRAVASTATIN SODIUM 10 MG: 10 TABLET ORAL at 08:30

## 2020-01-18 RX ADMIN — ENOXAPARIN SODIUM 40 MG: 40 INJECTION SUBCUTANEOUS at 08:29

## 2020-01-18 RX ADMIN — FERROUS SULFATE TAB 325 MG (65 MG ELEMENTAL FE) 325 MG: 325 (65 FE) TAB at 08:30

## 2020-01-18 RX ADMIN — ISOSORBIDE MONONITRATE 60 MG: 60 TABLET, EXTENDED RELEASE ORAL at 08:30

## 2020-01-18 RX ADMIN — HYDRALAZINE HYDROCHLORIDE 25 MG: 25 TABLET, FILM COATED ORAL at 08:30

## 2020-01-18 RX ADMIN — Medication 10 ML: at 08:32

## 2020-01-18 RX ADMIN — SPIRONOLACTONE 25 MG: 25 TABLET, FILM COATED ORAL at 08:30

## 2020-01-18 NOTE — CARE COORDINATION
ONGOING DISCHARGE PLAN:    Spoke with patient regarding discharge plan and patient confirms that plan is still to her sons where she is currently staying. The patient continues to decline VNS. Active orders for IV rocephin and lasix. Will continue to follow for additional discharge needs.     Electronically signed by Chintan Gilliam RN on 1/18/2020 at 10:08 AM

## 2020-01-18 NOTE — H&P
[facility] Internal Medicine       Patient name:  Nicolette Houston  Date of admission:  1/16/2020  6:11 PM  MRN:   [id number]  YOB: 1965      Cc sob       HPI   Pt admitted with sympts of sob         HPI   1) Location/Symptom sob   2) Timing/Onset: 1 week   3) Context/Setting: hx of CHF has AICD worsening   4) Quality: leg swelling and cough   5) Duration: continuous   6) Modifying Factors: non compliance   7) Severity: moderate     46 y/o female pt with hx of HTN CHF low EF 20 % with AICD   Presented with worsening sob no chest pain CXR shows edema   imporved with iv diuresis     Past Medical History:   Diagnosis Date    [facility]est[facility]) [facility]     Hypertension     NICM (nonischemic cardiomyopathy) ([facility]     Noncompliance      Family History   Problem Relation Age of Onset    Other Sister         hole in heart      reports that she has quit smoking. She smoked 1.50 packs per day. She has never used smokeless tobacco. She reports that she does not drink alcohol or use drugs. Past Medical History:   Diagnosis Date    CHF (congestive heart failure) (HCC)     Hypertension     NICM (nonischemic cardiomyopathy) (HCC)     Noncompliance      No Known Allergies    Review of systems    Constitutional: Negative for fever and fatigue. Respiratory: Negative for cough and  + shortness of breath. Cardiovascular: Negative for chest pain, palpitations and  + leg swelling. Gastrointestinal: Negative for abdominal pain, diarrhea and blood in stool. Endocrine: Negative for cold intolerance. Genitourinary: Negative for dysuria and frequency. Musculoskeletal: Negative for back pain and arthralgias. Skin: Negative for color change and rash. Neurological: Negative for dizziness and headaches. Psychiatric/Behavioral: Negative for confusion.      ROS  Physical exam     /62   Pulse 70   Temp 97.6 °F (36.4 °C) (Oral)   Resp 16   Ht 5' 3\" (1.6 m)   Wt 209 lb 3.5 oz (94.9 kg)   LMP

## 2020-01-26 NOTE — DISCHARGE SUMMARY
Internal Medicine   Discharge Summary         Patient Identification:  Buddy Blood is a 47 y.o. female. :  1965  MRN: [id number]     Acct: [de-identified]   Admit Date:  2020  Discharge date and time: 2020  5:21 PM     Attending Provider: No att. providers found                                       Reason for Admission: CHF     Discharge Diagnoses:   Patient Active Problem List   Diagnosis    Hypertension    Chest pain    Pulmonary edema cardiac cause [facility])          Consults:  Cardiology      Procedures  Hospital Course:       46 y/o female pt with hx of HTN CHF low EF 20 % with AICD   Presented with worsening sob no chest pain CXR shows edema   imporved with iv diuresis   CT shows bronchial thickening plan iv antibiotics   Plan to change to po diuresis and dc home am      Acute on chronic systolic failure - worse due to ac bronchitis     Disposition:   Home    Discharged Condition:  Stable     Discharge Medications:       Abisai Cotter   Home Medication Instructions [facility]:[id number]    Printed on:20 1241   Medication Information                      aspirin 81 MG chewable tablet  Take 162 mg by mouth daily              carvedilol (COREG) 25 MG tablet  Take 25 mg by mouth 2 times daily (with meals). ferrous sulfate 325 (65 FE) MG tablet  Take 325 mg by mouth daily (with breakfast). fluticasone (FLONASE) 50 MCG/ACT nasal spray  1 spray by Nasal route daily             furosemide (LASIX) 40 MG tablet  Take 40 mg by mouth 2 times daily. hydrALAZINE (APRESOLINE) 25 MG tablet  Take 25 mg by mouth 3 times daily             isosorbide mononitrate (IMDUR) 60 MG extended release tablet  Take 60 mg by mouth daily             potassium chloride (KLOR-CON) 10 MEQ extended release tablet  Take 10 mEq by mouth daily             pravastatin (PRAVACHOL) 10 MG tablet  Take 10 mg by mouth daily.              sacubitril-valsartan (ENTRESTO) 49-51 MG per tablet  Take 1 tablet by mouth 2 times daily             spironolactone (ALDACTONE) 25 MG tablet  Take 25 mg by mouth daily                 Discharge Instructions: Follow up with [facility] VIVIEN MCDONALD CNP in 2 weeks.       Hospital acquired Infections: None    Time spent for dc more than 30 min     Aracelis SANABRIA attending

## 2023-05-14 ENCOUNTER — HOSPITAL ENCOUNTER (INPATIENT)
Age: 58
LOS: 2 days | Discharge: HOME | DRG: 291 | End: 2023-05-16
Attending: EMERGENCY MEDICINE | Admitting: STUDENT IN AN ORGANIZED HEALTH CARE EDUCATION/TRAINING PROGRAM
Payer: MEDICARE

## 2023-05-14 ENCOUNTER — APPOINTMENT (OUTPATIENT)
Dept: GENERAL RADIOLOGY | Age: 58
DRG: 291 | End: 2023-05-14
Payer: MEDICARE

## 2023-05-14 DIAGNOSIS — R06.02 SHORTNESS OF BREATH: Primary | ICD-10-CM

## 2023-05-14 DIAGNOSIS — I50.9 CONGESTIVE HEART FAILURE, UNSPECIFIED HF CHRONICITY, UNSPECIFIED HEART FAILURE TYPE (HCC): ICD-10-CM

## 2023-05-14 LAB
ALBUMIN SERPL-MCNC: 3.6 G/DL (ref 3.5–5.2)
ALBUMIN/GLOBULIN RATIO: 1.1 (ref 1–2.5)
ALP SERPL-CCNC: 137 U/L (ref 35–104)
ALT SERPL-CCNC: 15 U/L (ref 5–33)
ANION GAP SERPL CALCULATED.3IONS-SCNC: 13 MMOL/L (ref 9–17)
AST SERPL-CCNC: 20 U/L
BILIRUB SERPL-MCNC: 1.4 MG/DL (ref 0.3–1.2)
BILIRUBIN URINE: ABNORMAL
BNP SERPL-MCNC: 4735 PG/ML
BUN SERPL-MCNC: 14 MG/DL (ref 6–20)
CALCIUM SERPL-MCNC: 9.4 MG/DL (ref 8.6–10.4)
CHLORIDE SERPL-SCNC: 103 MMOL/L (ref 98–107)
CO2 SERPL-SCNC: 24 MMOL/L (ref 20–31)
COLOR: ABNORMAL
CREAT SERPL-MCNC: 0.9 MG/DL (ref 0.5–0.9)
D DIMER BLD IA.RAPID-MCNC: 0.88 UG/ML FEU (ref 0–0.57)
EPITHELIAL CELLS UA: NORMAL /HPF (ref 0–5)
GFR SERPL CREATININE-BSD FRML MDRD: >60 ML/MIN/1.73M2
GLUCOSE BLD-MCNC: 132 MG/DL
GLUCOSE BLD-MCNC: 132 MG/DL (ref 65–105)
GLUCOSE SERPL-MCNC: 134 MG/DL (ref 70–99)
GLUCOSE UR STRIP.AUTO-MCNC: NEGATIVE MG/DL
HCT VFR BLD AUTO: 41.7 % (ref 36.3–47.1)
HGB BLD-MCNC: 13.5 G/DL (ref 11.9–15.1)
KETONES UR STRIP.AUTO-MCNC: ABNORMAL MG/DL
LEUKOCYTE ESTERASE UR QL STRIP.AUTO: ABNORMAL
MAGNESIUM SERPL-MCNC: 1.9 MG/DL (ref 1.6–2.6)
MCH RBC QN AUTO: 27.3 PG (ref 25.2–33.5)
MCHC RBC AUTO-ENTMCNC: 32.4 G/DL (ref 28.4–34.8)
MCV RBC AUTO: 84.2 FL (ref 82.6–102.9)
NITRITE UR QL STRIP.AUTO: NEGATIVE
NRBC AUTOMATED: 0 PER 100 WBC
PDW BLD-RTO: 22.5 % (ref 11.8–14.4)
PLATELET # BLD AUTO: ABNORMAL K/UL (ref 138–453)
PLATELET, FLUORESCENCE: 161 K/UL (ref 138–453)
PLATELET, IMMATURE FRACTION: 6.9 % (ref 1.1–10.3)
POTASSIUM SERPL-SCNC: 3.6 MMOL/L (ref 3.7–5.3)
PROT SERPL-MCNC: 7 G/DL (ref 6.4–8.3)
PROT UR STRIP.AUTO-MCNC: 5 MG/DL (ref 5–8)
PROT UR STRIP.AUTO-MCNC: ABNORMAL MG/DL
RBC # BLD: 4.95 M/UL (ref 3.95–5.11)
RBC CLUMPS #/AREA URNS AUTO: NORMAL /HPF (ref 0–2)
SODIUM SERPL-SCNC: 140 MMOL/L (ref 135–144)
SPECIFIC GRAVITY UA: 1.03 (ref 1–1.03)
TROPONIN I SERPL DL<=0.01 NG/ML-MCNC: 9 NG/L (ref 0–14)
TROPONIN I SERPL DL<=0.01 NG/ML-MCNC: 9 NG/L (ref 0–14)
TURBIDITY: ABNORMAL
URINE HGB: ABNORMAL
UROBILINOGEN, URINE: NORMAL
WBC # BLD AUTO: 4.8 K/UL (ref 3.5–11.3)
WBC UA: NORMAL /HPF (ref 0–5)

## 2023-05-14 PROCEDURE — 83880 ASSAY OF NATRIURETIC PEPTIDE: CPT

## 2023-05-14 PROCEDURE — 93005 ELECTROCARDIOGRAM TRACING: CPT | Performed by: STUDENT IN AN ORGANIZED HEALTH CARE EDUCATION/TRAINING PROGRAM

## 2023-05-14 PROCEDURE — 84484 ASSAY OF TROPONIN QUANT: CPT

## 2023-05-14 PROCEDURE — 85379 FIBRIN DEGRADATION QUANT: CPT

## 2023-05-14 PROCEDURE — 85055 RETICULATED PLATELET ASSAY: CPT

## 2023-05-14 PROCEDURE — 96374 THER/PROPH/DIAG INJ IV PUSH: CPT

## 2023-05-14 PROCEDURE — 81001 URINALYSIS AUTO W/SCOPE: CPT

## 2023-05-14 PROCEDURE — 6360000002 HC RX W HCPCS: Performed by: STUDENT IN AN ORGANIZED HEALTH CARE EDUCATION/TRAINING PROGRAM

## 2023-05-14 PROCEDURE — 71046 X-RAY EXAM CHEST 2 VIEWS: CPT

## 2023-05-14 PROCEDURE — 83735 ASSAY OF MAGNESIUM: CPT

## 2023-05-14 PROCEDURE — 2060000000 HC ICU INTERMEDIATE R&B

## 2023-05-14 PROCEDURE — 6370000000 HC RX 637 (ALT 250 FOR IP): Performed by: STUDENT IN AN ORGANIZED HEALTH CARE EDUCATION/TRAINING PROGRAM

## 2023-05-14 PROCEDURE — 99285 EMERGENCY DEPT VISIT HI MDM: CPT

## 2023-05-14 PROCEDURE — 82947 ASSAY GLUCOSE BLOOD QUANT: CPT

## 2023-05-14 PROCEDURE — 80053 COMPREHEN METABOLIC PANEL: CPT

## 2023-05-14 PROCEDURE — 85027 COMPLETE CBC AUTOMATED: CPT

## 2023-05-14 RX ORDER — POTASSIUM CHLORIDE 20 MEQ/1
40 TABLET, EXTENDED RELEASE ORAL PRN
Status: DISCONTINUED | OUTPATIENT
Start: 2023-05-14 | End: 2023-05-16 | Stop reason: HOSPADM

## 2023-05-14 RX ORDER — NITROGLYCERIN 0.4 MG/1
0.4 TABLET SUBLINGUAL ONCE
Status: COMPLETED | OUTPATIENT
Start: 2023-05-14 | End: 2023-05-14

## 2023-05-14 RX ORDER — ENOXAPARIN SODIUM 100 MG/ML
40 INJECTION SUBCUTANEOUS DAILY
Status: DISCONTINUED | OUTPATIENT
Start: 2023-05-15 | End: 2023-05-16 | Stop reason: HOSPADM

## 2023-05-14 RX ORDER — SODIUM CHLORIDE 0.9 % (FLUSH) 0.9 %
10 SYRINGE (ML) INJECTION PRN
Status: DISCONTINUED | OUTPATIENT
Start: 2023-05-14 | End: 2023-05-16 | Stop reason: HOSPADM

## 2023-05-14 RX ORDER — ACETAMINOPHEN 650 MG/1
650 SUPPOSITORY RECTAL EVERY 6 HOURS PRN
Status: DISCONTINUED | OUTPATIENT
Start: 2023-05-14 | End: 2023-05-16 | Stop reason: HOSPADM

## 2023-05-14 RX ORDER — SODIUM CHLORIDE 9 MG/ML
INJECTION, SOLUTION INTRAVENOUS PRN
Status: DISCONTINUED | OUTPATIENT
Start: 2023-05-14 | End: 2023-05-16 | Stop reason: HOSPADM

## 2023-05-14 RX ORDER — ONDANSETRON 2 MG/ML
4 INJECTION INTRAMUSCULAR; INTRAVENOUS EVERY 6 HOURS PRN
Status: DISCONTINUED | OUTPATIENT
Start: 2023-05-14 | End: 2023-05-16 | Stop reason: HOSPADM

## 2023-05-14 RX ORDER — POTASSIUM CHLORIDE 7.45 MG/ML
10 INJECTION INTRAVENOUS PRN
Status: DISCONTINUED | OUTPATIENT
Start: 2023-05-14 | End: 2023-05-16 | Stop reason: HOSPADM

## 2023-05-14 RX ORDER — ACETAMINOPHEN 325 MG/1
650 TABLET ORAL EVERY 6 HOURS PRN
Status: DISCONTINUED | OUTPATIENT
Start: 2023-05-14 | End: 2023-05-16 | Stop reason: HOSPADM

## 2023-05-14 RX ORDER — FUROSEMIDE 10 MG/ML
40 INJECTION INTRAMUSCULAR; INTRAVENOUS ONCE
Status: COMPLETED | OUTPATIENT
Start: 2023-05-14 | End: 2023-05-14

## 2023-05-14 RX ORDER — SODIUM CHLORIDE 0.9 % (FLUSH) 0.9 %
5-40 SYRINGE (ML) INJECTION EVERY 12 HOURS SCHEDULED
Status: DISCONTINUED | OUTPATIENT
Start: 2023-05-15 | End: 2023-05-16 | Stop reason: HOSPADM

## 2023-05-14 RX ORDER — POLYETHYLENE GLYCOL 3350 17 G/17G
17 POWDER, FOR SOLUTION ORAL DAILY PRN
Status: DISCONTINUED | OUTPATIENT
Start: 2023-05-14 | End: 2023-05-16 | Stop reason: HOSPADM

## 2023-05-14 RX ORDER — ONDANSETRON 4 MG/1
4 TABLET, ORALLY DISINTEGRATING ORAL EVERY 8 HOURS PRN
Status: DISCONTINUED | OUTPATIENT
Start: 2023-05-14 | End: 2023-05-16 | Stop reason: HOSPADM

## 2023-05-14 RX ORDER — MAGNESIUM SULFATE IN WATER 40 MG/ML
2000 INJECTION, SOLUTION INTRAVENOUS PRN
Status: DISCONTINUED | OUTPATIENT
Start: 2023-05-14 | End: 2023-05-16 | Stop reason: HOSPADM

## 2023-05-14 RX ORDER — FUROSEMIDE 10 MG/ML
40 INJECTION INTRAMUSCULAR; INTRAVENOUS 2 TIMES DAILY
Status: DISCONTINUED | OUTPATIENT
Start: 2023-05-15 | End: 2023-05-16 | Stop reason: HOSPADM

## 2023-05-14 RX ADMIN — NITROGLYCERIN 0.4 MG: 0.4 TABLET SUBLINGUAL at 21:23

## 2023-05-14 RX ADMIN — FUROSEMIDE 40 MG: 10 INJECTION, SOLUTION INTRAMUSCULAR; INTRAVENOUS at 22:33

## 2023-05-15 PROBLEM — Z95.810 AICD (AUTOMATIC CARDIOVERTER/DEFIBRILLATOR) PRESENT: Status: ACTIVE | Noted: 2023-05-15

## 2023-05-15 PROBLEM — I50.9 CONGESTIVE HEART FAILURE (HCC): Status: ACTIVE | Noted: 2023-05-15

## 2023-05-15 PROBLEM — I50.23 ACUTE ON CHRONIC SYSTOLIC HEART FAILURE (HCC): Status: ACTIVE | Noted: 2023-05-14

## 2023-05-15 PROBLEM — R06.02 SHORTNESS OF BREATH: Status: ACTIVE | Noted: 2023-05-15

## 2023-05-15 LAB
ABSOLUTE EOS #: 0.17 K/UL (ref 0–0.44)
ABSOLUTE IMMATURE GRANULOCYTE: 0 K/UL (ref 0–0.3)
ABSOLUTE LYMPH #: 1.57 K/UL (ref 1.1–3.7)
ABSOLUTE MONO #: 0.45 K/UL (ref 0.1–1.2)
ANION GAP SERPL CALCULATED.3IONS-SCNC: 14 MMOL/L (ref 9–17)
BASOPHILS # BLD: 1 % (ref 0–2)
BASOPHILS ABSOLUTE: 0.06 K/UL (ref 0–0.2)
BNP SERPL-MCNC: 3717 PG/ML
BUN SERPL-MCNC: 17 MG/DL (ref 6–20)
CALCIUM SERPL-MCNC: 9.3 MG/DL (ref 8.6–10.4)
CHLORIDE SERPL-SCNC: 103 MMOL/L (ref 98–107)
CHOLEST SERPL-MCNC: 149 MG/DL
CHOLESTEROL/HDL RATIO: 4.7
CO2 SERPL-SCNC: 23 MMOL/L (ref 20–31)
CREAT SERPL-MCNC: 0.83 MG/DL (ref 0.5–0.9)
EOSINOPHILS RELATIVE PERCENT: 3 % (ref 1–4)
GFR SERPL CREATININE-BSD FRML MDRD: >60 ML/MIN/1.73M2
GLUCOSE SERPL-MCNC: 129 MG/DL (ref 70–99)
HCT VFR BLD AUTO: 40.3 % (ref 36.3–47.1)
HDLC SERPL-MCNC: 32 MG/DL
HGB BLD-MCNC: 12.8 G/DL (ref 11.9–15.1)
IMMATURE GRANULOCYTES: 0 %
INR PPP: 1.1
LDLC SERPL CALC-MCNC: 92 MG/DL (ref 0–130)
LV EF: 10 %
LVEF MODALITY: NORMAL
LYMPHOCYTES # BLD: 28 % (ref 24–43)
MAGNESIUM SERPL-MCNC: 1.9 MG/DL (ref 1.6–2.6)
MCH RBC QN AUTO: 27.1 PG (ref 25.2–33.5)
MCHC RBC AUTO-ENTMCNC: 31.8 G/DL (ref 28.4–34.8)
MCV RBC AUTO: 85.4 FL (ref 82.6–102.9)
MONOCYTES # BLD: 8 % (ref 3–12)
MORPHOLOGY: ABNORMAL
NRBC AUTOMATED: 0 PER 100 WBC
PDW BLD-RTO: 22.5 % (ref 11.8–14.4)
PLATELET # BLD AUTO: ABNORMAL K/UL (ref 138–453)
PLATELET, FLUORESCENCE: 141 K/UL (ref 138–453)
PLATELET, IMMATURE FRACTION: 7.3 % (ref 1.1–10.3)
POTASSIUM SERPL-SCNC: 3.5 MMOL/L (ref 3.7–5.3)
PROTHROMBIN TIME: 14.2 SEC (ref 11.7–14.9)
RBC # BLD: 4.72 M/UL (ref 3.95–5.11)
SEG NEUTROPHILS: 60 % (ref 36–65)
SEGMENTED NEUTROPHILS ABSOLUTE COUNT: 3.35 K/UL (ref 1.5–8.1)
SODIUM SERPL-SCNC: 140 MMOL/L (ref 135–144)
TRIGL SERPL-MCNC: 124 MG/DL
TROPONIN I SERPL DL<=0.01 NG/ML-MCNC: 9 NG/L (ref 0–14)
TSH SERPL-ACNC: 1.94 UIU/ML (ref 0.3–5)
WBC # BLD AUTO: 5.6 K/UL (ref 3.5–11.3)

## 2023-05-15 PROCEDURE — 80048 BASIC METABOLIC PNL TOTAL CA: CPT

## 2023-05-15 PROCEDURE — 2060000000 HC ICU INTERMEDIATE R&B

## 2023-05-15 PROCEDURE — 83880 ASSAY OF NATRIURETIC PEPTIDE: CPT

## 2023-05-15 PROCEDURE — 2T015 HOSPITALIST 2ND TOUCH: CPT | Performed by: STUDENT IN AN ORGANIZED HEALTH CARE EDUCATION/TRAINING PROGRAM

## 2023-05-15 PROCEDURE — 36415 COLL VENOUS BLD VENIPUNCTURE: CPT

## 2023-05-15 PROCEDURE — 6360000002 HC RX W HCPCS: Performed by: NURSE PRACTITIONER

## 2023-05-15 PROCEDURE — 97161 PT EVAL LOW COMPLEX 20 MIN: CPT

## 2023-05-15 PROCEDURE — 83735 ASSAY OF MAGNESIUM: CPT

## 2023-05-15 PROCEDURE — 85610 PROTHROMBIN TIME: CPT

## 2023-05-15 PROCEDURE — 97530 THERAPEUTIC ACTIVITIES: CPT

## 2023-05-15 PROCEDURE — 80061 LIPID PANEL: CPT

## 2023-05-15 PROCEDURE — 2580000003 HC RX 258: Performed by: NURSE PRACTITIONER

## 2023-05-15 PROCEDURE — 93306 TTE W/DOPPLER COMPLETE: CPT

## 2023-05-15 PROCEDURE — 85055 RETICULATED PLATELET ASSAY: CPT

## 2023-05-15 PROCEDURE — 85025 COMPLETE CBC W/AUTO DIFF WBC: CPT

## 2023-05-15 PROCEDURE — 6370000000 HC RX 637 (ALT 250 FOR IP): Performed by: STUDENT IN AN ORGANIZED HEALTH CARE EDUCATION/TRAINING PROGRAM

## 2023-05-15 PROCEDURE — 99222 1ST HOSP IP/OBS MODERATE 55: CPT | Performed by: STUDENT IN AN ORGANIZED HEALTH CARE EDUCATION/TRAINING PROGRAM

## 2023-05-15 PROCEDURE — 84484 ASSAY OF TROPONIN QUANT: CPT

## 2023-05-15 PROCEDURE — 84443 ASSAY THYROID STIM HORMONE: CPT

## 2023-05-15 RX ORDER — ASPIRIN 81 MG/1
162 TABLET, CHEWABLE ORAL DAILY
Status: DISCONTINUED | OUTPATIENT
Start: 2023-05-15 | End: 2023-05-15

## 2023-05-15 RX ORDER — CARVEDILOL 25 MG/1
25 TABLET ORAL 2 TIMES DAILY WITH MEALS
Status: DISCONTINUED | OUTPATIENT
Start: 2023-05-15 | End: 2023-05-16 | Stop reason: HOSPADM

## 2023-05-15 RX ORDER — SPIRONOLACTONE 25 MG/1
25 TABLET ORAL DAILY
Status: DISCONTINUED | OUTPATIENT
Start: 2023-05-15 | End: 2023-05-16 | Stop reason: HOSPADM

## 2023-05-15 RX ORDER — POTASSIUM CHLORIDE 20 MEQ/1
20 TABLET, EXTENDED RELEASE ORAL 2 TIMES DAILY WITH MEALS
Status: DISCONTINUED | OUTPATIENT
Start: 2023-05-15 | End: 2023-05-16 | Stop reason: HOSPADM

## 2023-05-15 RX ORDER — ASPIRIN 81 MG/1
81 TABLET, CHEWABLE ORAL DAILY
Status: DISCONTINUED | OUTPATIENT
Start: 2023-05-15 | End: 2023-05-16 | Stop reason: HOSPADM

## 2023-05-15 RX ORDER — PRAVASTATIN SODIUM 10 MG
10 TABLET ORAL DAILY
Status: DISCONTINUED | OUTPATIENT
Start: 2023-05-15 | End: 2023-05-16 | Stop reason: HOSPADM

## 2023-05-15 RX ADMIN — ENOXAPARIN SODIUM 40 MG: 40 INJECTION SUBCUTANEOUS at 09:12

## 2023-05-15 RX ADMIN — SACUBITRIL AND VALSARTAN 1 TABLET: 49; 51 TABLET, FILM COATED ORAL at 19:52

## 2023-05-15 RX ADMIN — ASPIRIN 81 MG: 81 TABLET, CHEWABLE ORAL at 09:08

## 2023-05-15 RX ADMIN — SPIRONOLACTONE 25 MG: 25 TABLET ORAL at 09:08

## 2023-05-15 RX ADMIN — SACUBITRIL AND VALSARTAN 1 TABLET: 49; 51 TABLET, FILM COATED ORAL at 09:11

## 2023-05-15 RX ADMIN — SODIUM CHLORIDE, PRESERVATIVE FREE 10 ML: 5 INJECTION INTRAVENOUS at 09:09

## 2023-05-15 RX ADMIN — POTASSIUM CHLORIDE 20 MEQ: 1500 TABLET, EXTENDED RELEASE ORAL at 09:08

## 2023-05-15 RX ADMIN — CARVEDILOL 25 MG: 25 TABLET, FILM COATED ORAL at 09:08

## 2023-05-15 RX ADMIN — POTASSIUM CHLORIDE 20 MEQ: 1500 TABLET, EXTENDED RELEASE ORAL at 17:49

## 2023-05-15 RX ADMIN — FUROSEMIDE 40 MG: 10 INJECTION, SOLUTION INTRAMUSCULAR; INTRAVENOUS at 18:25

## 2023-05-15 RX ADMIN — SODIUM CHLORIDE, PRESERVATIVE FREE 10 ML: 5 INJECTION INTRAVENOUS at 19:52

## 2023-05-15 RX ADMIN — CARVEDILOL 25 MG: 25 TABLET, FILM COATED ORAL at 17:49

## 2023-05-15 RX ADMIN — FUROSEMIDE 40 MG: 10 INJECTION, SOLUTION INTRAMUSCULAR; INTRAVENOUS at 09:09

## 2023-05-15 RX ADMIN — PRAVASTATIN SODIUM 10 MG: 10 TABLET ORAL at 09:08

## 2023-05-15 NOTE — H&P
[facility]  Office: [address], DO, Ibrahima Somersworth, DO, Rosa M Beets, DO, Kalpana Luther Blood, DO, Felicia Arana MD, Fabiola Acuña MD, Johnnie Claude, MD, Giuliana Snyder MD,  Miriam Cole MD, Truett Burkitt, MD, Rebecca Owens, DO, Nuvia Felder MD,  Asad Escobar MD, Manjeet Curtis MD, Isabela Villa, DO, Susan Britt MD, Lydia Thornton MD, Traci Velasco, DO, Ceasar Angela MD, Jimmy Blanca MD, Kira Wilder MD, Lorna Benavides MD,  Corrie Duran, DO, Lauren Velazquez MD,  Fer Good, CNP,  Amarilis Cardenas, CNP, Nayely Negro, CNP, Samaria Horton, CNP,  Wil Mckeon, DNP, Marylen Net, CNP, Oneil Geronimo, CNP, Akilah Montejo, CNP, Kenzie Menendez, CNP, Dmitry Barron, CNP, Aby Mcknight PA-C, Tracie Ceron, CNS, Cliff Mcdonald, CNP, Maria M Moreno, CNP         [address]    HISTORY AND PHYSICAL EXAMINATION            Date:   5/15/2023  Patient name:  Tim Cervantes  Date of admission:  5/14/2023  8:51 PM  MRN:   [id number]  Account:  [de-identified]  YOB: 1965  PCP:    VIVIEN Crandall CNP  Room:   [facility]/8560-02  Code Status:    Full Code    Chief Complaint:     Chief Complaint   Patient presents with    Shortness of Breath    Fatigue     Pt has CHF       History Obtained From:     patient    History of Present Illness:     Tim Case is a 62 y.o. Non- / non  female who presents with Shortness of Breath and Fatigue (Pt has CHF)   and is admitted to the hospital for the management of Acute decompensated heart failure ([facility] [address] 66-year-old female with a past medical history of CHF with reduced ejection fraction, hypertension, and iron-deficiency anemia presents to the emergency department for shortness of breath, dyspnea exertion, and bilateral lower extremity swelling only. Patient comes from [place] to visit family for Mother's Day.   Patient

## 2023-05-15 NOTE — CARE COORDINATION
Case Management Assessment  Initial Evaluation    Date/Time of Evaluation: 5/15/2023 10:59 AM  Assessment Completed by: Sheri Cardenas RN    If patient is discharged prior to next notation, then this note serves as note for discharge by case management. Patient Name: Tim Cervantes                   YOB: 1965  Diagnosis: Shortness of breath [R06.02]  Acute decompensated heart failure ([facility] [address] [I50.9]  Congestive heart failure, unspecified HF chronicity, unspecified heart failure type ([facility] [address] [I50.9]                   Date / Time: 5/14/2023  8:51 PM    Patient Admission Status: Inpatient   Readmission Risk (Low < 19, Mod (19-27), High > 27): Readmission Risk Score: 9.2    Current PCP: No primary care provider on file. PCP verified by CM? Yes (PCP is Patsy Cummings CNP at [address] [place] [address])    Chart Reviewed: Yes      History Provided by: Patient  Patient Orientation: Alert and Oriented, Person, Place, Situation    Patient Cognition: Alert    Hospitalization in the last 30 days (Readmission):  No    If yes, Readmission Assessment in CM Navigator will be completed. Advance Directives:      Code Status: Full Code   Patient's Primary Decision Maker is: Legal Next of Kin      Discharge Planning:    Patient lives with: Alone Type of Home: Apartment  Primary Care Giver: Self  Patient Support Systems include: Family Members, Children   Current Financial resources: Medicare ([employer] medicare. faxed card to registration)  Current community resources:    Current services prior to admission: None            Current DME:              Type of Home Care services:  None    ADLS  Prior functional level: Independent in ADLs/IADLs  Current functional level: Independent in ADLs/IADLs    PT AM-PAC:   /24  OT AM-PAC:   /24    Family can provide assistance at DC:  Yes  Would you like Case Management to discuss the discharge plan with any other family members/significant others, and if so, who?

## 2023-05-15 NOTE — CONSULTS
Nutrition Note    Consult for CHF diet education. Patient reports she has received diet education at multiple hospitals and is not interested in reviewing again. She accepted the handout provided.      Electronically signed by Farhana Wofle RD on 5/15/23 at 12:25 PM EDT    Contact: [phone]

## 2023-05-15 NOTE — ED TRIAGE NOTES
Pt to ED with c/o SOB. Pt states she has history of CHF, compliant with meds but lately she has increasing SOB especially when walking long distances. Pt appeared in slight distress, o2 sat 92-93 % on RA, RR 24-26. Pt is alert and oriented x 4.

## 2023-05-15 NOTE — DISCHARGE SUMMARY
[facility]  Office: [address], DO, Alexis Reaves, DO, Tra Whiting, DO, Huang Majano Blood, DO, Feli Lester MD, Evette Mckeon MD, Alejandro Montano MD, Bharti Mckeon MD,  Nikole Nazario MD, Renny Guerra MD, Lionel Wislon, DO, Yasmeen Walden MD,  Elizabeth Clemente, DO, Antelmo Hooker MD, Flori Roberts MD, Sharlene Diego, DO, Tyson Waite MD, Chandler Lombard, MD, Cassidy Dietz, DO, Carla Rogers MD, Ryland Montilla MD, Ritu Childress MD, Jhonatan Em MD,  Evangelista Shields, DO, Ruby Verma MD,  Ban Randall, CNP,  Isa Benitez, CNP, Refugio Ha, CNP, Molly Ponce, CNP,  Everlean Nyhan, DNP, Hector Shultz, CNP, James Munoz, CNP, Robert Hung, CNP, Rian Rodriguez, CNP, Davy Johnson, CNP, Deni Rodriguez PAAngelinaC, Guanakito Juarez, CNS, Kadie Abdullahi, CNP, Julia Jack, CNP         [facility] [address]    Second Visit Note  For more detailed information please refer to the progress note of the day      5/15/2023    4:35 PM    Name:   Robert Fortune  MRN:     [id number]     Lidialyside:      [de-identified]   Room:   [address] [place] [address] Day:  1  Admit Date:  5/14/2023  8:51 PM    PCP:   No primary care provider on file.   Code Status:  Full Code      Pt vitals were reviewed   New labs were reviewed   Patient was seen, she feels much better  Sob improved  No sob    Exam  Patient is alert and oriented  Clear to auscultation bilaterally,occasional basal crackles  Regular rate and rhythm  No abdominal tenderness, no organomegaly  trace pedal edema      Updated plan :     Resume goal directed medical therapy for systolic heart failure  Patient responding well to diuresis  She is from [place] and needs pcp and cardio referrals here  Possible discharge tomorrow      Antelmo Hooker MD  5/15/2023  4:35 PM

## 2023-05-15 NOTE — ED PROVIDER NOTES
[facility] 4B STEPDOWN  Emergency Department Encounter  Emergency Medicine Resident     Pt Name:Jen Tony  MRN: [id number]  [place] 1965  Date of evaluation: 5/14/23  PCP:  VIVIEN Ziegler CNP  Note Started: 8:55 PM EDT      CHIEF COMPLAINT       Chief Complaint   Patient presents with    Shortness of Breath    Fatigue     Pt has CHF       HISTORY OF PRESENT ILLNESS  (Location/Symptom, Timing/Onset, Context/Setting, Quality, Duration, Modifying Factors, Severity.)      Dirk Julio is a 62 y.o. female who presents with shortness of breath. Patient has a history of CHF, she states that she has become increasingly short of breath walking to her mailbox, walking around the block. Patient states that she has some slightly worsening lower extremity swelling. Patient is not currently on any blood thinners. Patient denies history of hypertension, she is on multiple antihypertensive medications. Patient is not currently having any chest pain, she is not currently having any fevers or chills, no nausea or vomiting. PAST MEDICAL / SURGICAL / SOCIAL / FAMILY HISTORY      has a past medical history of CHF (congestive heart failure) ([address] Hypertension, NICM (nonischemic cardiomyopathy) ([facility] [address] and Noncompliance. has a past surgical history that includes Cardiac catheterization (2008).       Social History     Socioeconomic History    Marital status: Single     Spouse name: Not on file    Number of children: 3    Years of education: Not on file    Highest education level: Not on file   Occupational History    Not on file   Tobacco Use    Smoking status: Former     Packs/day: 1.50     Types: Cigarettes    Smokeless tobacco: Never   Vaping Use    Vaping Use: Never used   Substance and Sexual Activity    Alcohol use: No    Drug use: No    Sexual activity: Not on file   Other Topics Concern    Not on file   Social History Narrative    Not on file     Social Determinants of Health     Financial

## 2023-05-15 NOTE — ED NOTES
Pt placed on o2 at 2 lpm, pt's o2 sat is low 90's on RA.       Eddie Chamberlain RN  05/14/23 3254
Pt report given to Pam Keller RN. All questions answered.      Edwin Sainz RN  05/14/23 8614
mouth daily (with breakfast). FLUTICASONE (FLONASE) 50 MCG/ACT NASAL SPRAY    1 spray by Nasal route daily    FUROSEMIDE (LASIX) 40 MG TABLET    Take 40 mg by mouth 2 times daily. HYDRALAZINE (APRESOLINE) 25 MG TABLET    Take 25 mg by mouth 3 times daily    ISOSORBIDE MONONITRATE (IMDUR) 60 MG EXTENDED RELEASE TABLET    Take 60 mg by mouth daily    POTASSIUM CHLORIDE (KLOR-CON) 10 MEQ EXTENDED RELEASE TABLET    Take 10 mEq by mouth daily    PRAVASTATIN (PRAVACHOL) 10 MG TABLET    Take 10 mg by mouth daily.     SACUBITRIL-VALSARTAN (ENTRESTO) 49-51 MG PER TABLET    Take 1 tablet by mouth 2 times daily    SPIRONOLACTONE (ALDACTONE) 25 MG TABLET    Take 25 mg by mouth daily     Orders Placed This Encounter   Medications    nitroGLYCERIN (NITROSTAT) SL tablet 0.4 mg    furosemide (LASIX) injection 40 mg       SURGICAL HISTORY       Past Surgical History:   Procedure Laterality Date    CARDIAC CATHETERIZATION  2008       PAST MEDICAL HISTORY       Past Medical History:   Diagnosis Date    CHF (congestive heart failure) ([facility]     Hypertension     NICM (nonischemic cardiomyopathy) ([facility]     Noncompliance        Labs:  Labs Reviewed   CBC - Abnormal; Notable for the following components:       Result Value    RDW 22.5 (*)     All other components within normal limits   COMPREHENSIVE METABOLIC PANEL - Abnormal; Notable for the following components:    Glucose 134 (*)     Potassium 3.6 (*)     Alkaline Phosphatase 137 (*)     Total Bilirubin 1.4 (*)     All other components within normal limits   URINALYSIS WITH REFLEX TO CULTURE - Abnormal; Notable for the following components:    Color, UA Dark Yellow (*)     Turbidity UA Cloudy (*)     Bilirubin Urine SMALL (*)     Ketones, Urine TRACE (*)     Urine Hgb SMALL (*)     Protein, UA 3+ (*)     Leukocyte Esterase, Urine TRACE (*)     All other components within normal limits   BRAIN NATRIURETIC PEPTIDE - Abnormal; Notable for the following components:    Pro-BNP 4,735 (*)

## 2023-05-15 NOTE — ED PROVIDER NOTES
[facility]  Emergency Department  Faculty Attestation     I performed a history and physical examination of the patient and discussed management with the resident. I reviewed the residents note and agree with the documented findings and plan of care. Any areas of disagreement are noted on the chart. I was personally present for the key portions of any procedures. I have documented in the chart those procedures where I was not present during the key portions. I have reviewed the emergency nurses triage note. I agree with the chief complaint, past medical history, past surgical history, allergies, medications, social and family history as documented unless otherwise noted below. For Physician Assistant/ Nurse Practitioner cases/documentation I have personally evaluated this patient and have completed at least one if not all key elements of the E/M (history, physical exam, and MDM). Additional findings are as noted. Preliminary note started at 9:16 PM EDT    Primary Care Physician:  VIVIEN Rangel CNP    Screenings:  [unfilled]    CHIEF COMPLAINT       Chief Complaint   Patient presents with    Shortness of Breath    Fatigue     Pt has CHF       RECENT VITALS:   Pulse 75   Temp 98.4 °F (36.9 °C) (Oral)   Resp 22   LMP 12/11/2014 (Approximate)   SpO2 94%     LABS:  Labs Reviewed   CBC   COMPREHENSIVE METABOLIC PANEL   MAGNESIUM   TROPONIN   TROPONIN   URINALYSIS WITH REFLEX TO CULTURE   BRAIN NATRIURETIC PEPTIDE   D-DIMER, QUANTITATIVE   POCT GLUCOSE       Radiology  XR CHEST (2 VW)    (Results Pending)       CRITICAL CARE: There was a high probability of clinically significant/life threatening deterioration in this patient's condition which required my urgent intervention. Total critical care time was none minutes. This excludes any time for separately reportable procedures.      EKG:  EKG Interpretation    Interpreted by me    Rhythm: normal sinus   Rate:

## 2023-05-15 NOTE — PLAN OF CARE
Problem: Discharge Planning  Goal: Discharge to home or other facility with appropriate resources  Outcome: Adequate for Discharge     Problem: Chronic Conditions and Co-morbidities  Goal: Patient's chronic conditions and co-morbidity symptoms are monitored and maintained or improved  5/15/2023 1705 by Milena Mckinley RN  Outcome: Progressing  5/15/2023 1704 by Milena Mckinley RN  Outcome: Progressing

## 2023-05-15 NOTE — ED PROVIDER NOTES
[address] HANDOFF       Handoff taken on the following patient from prior Attending Physician:  Pt Name: Christ Martinez  PCP:  VIVIEN Hansen CNP    Attestation  I was available and discussed any additional care issues that arose and coordinated the management plans with the resident(s) caring for the patient during my duty period. Any areas of disagreement with resident's documentation of care or procedures are noted on the chart. I was personally present for the key portions of any/all procedures during my duty period. I have documented in the chart those procedures where I was not present during the key portions.           Mignon Martinez MD  05/14/23 6030

## 2023-05-16 VITALS
HEART RATE: 68 BPM | RESPIRATION RATE: 18 BRPM | OXYGEN SATURATION: 93 % | SYSTOLIC BLOOD PRESSURE: 109 MMHG | TEMPERATURE: 97.4 F | DIASTOLIC BLOOD PRESSURE: 80 MMHG

## 2023-05-16 LAB
ANION GAP SERPL CALCULATED.3IONS-SCNC: 9 MMOL/L (ref 9–17)
BUN SERPL-MCNC: 18 MG/DL (ref 6–20)
CALCIUM SERPL-MCNC: 9.3 MG/DL (ref 8.6–10.4)
CHLORIDE SERPL-SCNC: 103 MMOL/L (ref 98–107)
CO2 SERPL-SCNC: 27 MMOL/L (ref 20–31)
CREAT SERPL-MCNC: 0.83 MG/DL (ref 0.5–0.9)
GFR SERPL CREATININE-BSD FRML MDRD: >60 ML/MIN/1.73M2
GLUCOSE SERPL-MCNC: 115 MG/DL (ref 70–99)
MAGNESIUM SERPL-MCNC: 1.9 MG/DL (ref 1.6–2.6)
POTASSIUM SERPL-SCNC: 4.2 MMOL/L (ref 3.7–5.3)
REASON FOR REJECTION: NORMAL
SODIUM SERPL-SCNC: 139 MMOL/L (ref 135–144)
SPECIMEN SOURCE: NORMAL
ZZ NTE CLEAN UP: ORDERED TEST: NORMAL

## 2023-05-16 PROCEDURE — 36415 COLL VENOUS BLD VENIPUNCTURE: CPT

## 2023-05-16 PROCEDURE — 6370000000 HC RX 637 (ALT 250 FOR IP): Performed by: STUDENT IN AN ORGANIZED HEALTH CARE EDUCATION/TRAINING PROGRAM

## 2023-05-16 PROCEDURE — 6360000002 HC RX W HCPCS: Performed by: NURSE PRACTITIONER

## 2023-05-16 PROCEDURE — 80048 BASIC METABOLIC PNL TOTAL CA: CPT

## 2023-05-16 PROCEDURE — 83735 ASSAY OF MAGNESIUM: CPT

## 2023-05-16 PROCEDURE — 2580000003 HC RX 258: Performed by: NURSE PRACTITIONER

## 2023-05-16 PROCEDURE — 99238 HOSP IP/OBS DSCHRG MGMT 30/<: CPT | Performed by: INTERNAL MEDICINE

## 2023-05-16 PROCEDURE — 6370000000 HC RX 637 (ALT 250 FOR IP): Performed by: NURSE PRACTITIONER

## 2023-05-16 RX ORDER — ASPIRIN 81 MG/1
81 TABLET, CHEWABLE ORAL DAILY
Qty: 30 TABLET | Refills: 3 | Status: SHIPPED
Start: 2023-05-16

## 2023-05-16 RX ADMIN — SPIRONOLACTONE 25 MG: 25 TABLET ORAL at 08:59

## 2023-05-16 RX ADMIN — CARVEDILOL 25 MG: 25 TABLET, FILM COATED ORAL at 08:59

## 2023-05-16 RX ADMIN — ASPIRIN 81 MG: 81 TABLET, CHEWABLE ORAL at 08:59

## 2023-05-16 RX ADMIN — FUROSEMIDE 40 MG: 10 INJECTION, SOLUTION INTRAMUSCULAR; INTRAVENOUS at 08:58

## 2023-05-16 RX ADMIN — ENOXAPARIN SODIUM 40 MG: 40 INJECTION SUBCUTANEOUS at 08:58

## 2023-05-16 RX ADMIN — SODIUM CHLORIDE, PRESERVATIVE FREE 10 ML: 5 INJECTION INTRAVENOUS at 08:59

## 2023-05-16 RX ADMIN — SACUBITRIL AND VALSARTAN 1 TABLET: 49; 51 TABLET, FILM COATED ORAL at 08:58

## 2023-05-16 RX ADMIN — ONDANSETRON 4 MG: 4 TABLET, ORALLY DISINTEGRATING ORAL at 00:00

## 2023-05-16 RX ADMIN — POTASSIUM CHLORIDE 20 MEQ: 1500 TABLET, EXTENDED RELEASE ORAL at 08:58

## 2023-05-16 RX ADMIN — PRAVASTATIN SODIUM 10 MG: 10 TABLET ORAL at 08:58

## 2023-05-16 RX ADMIN — POTASSIUM CHLORIDE 40 MEQ: 1500 TABLET, EXTENDED RELEASE ORAL at 05:43

## 2023-05-16 NOTE — PROGRESS NOTES
[facility])  Occupational Therapy Not Seen Note    DATE: 5/15/2023    NAME: Manav Walls  MRN: [id number]   : 1965      Patient not seen this date for Occupational Therapy due to: Other: Pt states ability to get around [place] however would like therapy orders held until tomorrow incase of increased need. OT will check back tomorrow as able.     Next Scheduled Treatment:       Electronically signed by DESTINY Crum on 5/15/2023 at 1:52 PM
Congestive Heart Failure Education completed and charted. CHF booklet given. Patient was receptive to education. Discussed the  importance of medication compliance. Discussed the importance of a heart healthy diet. Discussed 2000 mg sodium-restricted daily diet. Patient instructed to limit fluid intake to  1.5 to 2 liters per day. Patient instructed to weigh self at the same time of each day each morning, reinforced teaching to monitor for 3-5 lb weight increase over 1-2 days notify physician if change noted. Signs and symptoms of CHF discussed with patient, such as feeling more tired than normal, feeling short of breath, coughing that increases when lying down, sudden weight gain, swelling of the feet, legs or belly. Patient verbalized understanding to notify physician office if these symptoms occur. EF 15-50%  Pt is from [place] and seeing Cardiology . She hopes to move to [facility] and get established with [facility].     CHF Clinic contact info given to pt
Echo completed in the Echo Lab.
Home Oxygen Evaluation    Home Oxygen Evaluation completed. Patient is on 2 liters per minute via nasal cannula. Resting SpO2 = 99%  Resting SpO2 on room air = 96%    SpO2 on room air with exercise = 92%    Nocturnal Oximetry with patient on room air is recommended is SpO2 is between 89% and 95% (requires additional order).     Desirae Miller RCP  12:29 PM
[facility]  Office: [address], DO, Marissa Marking, DO, Carlie Guerra, DO, Radha Jaimes Blood, DO, Margie Reyes MD, Hetal Saravia MD, Francine Milian MD, Nasir Arrieta MD,  Ajit Nguyen MD, Faith Castillo MD, Leatha Childress, DO, Raymond Parra MD,  Felicia Hickey, DO, Win Saucedo MD, Jonatan Gleason MD, Thai Feliz, DO, Gray Diop MD, Michael Jeter MD, Pankaj Rodriguez, DO, Faizan Montoya MD, Eliza Amezcua MD, Rigo Olmedo MD, Federico Vizcaino MD,  Tye Paiz, DO, Vaishnavi Drew MD,  Basilio Oliver, CNP,  Dylan Iraheta, CNP, Yadira Gurrola, CNP, Colin Johnson, CNP,  Jorgito Santiago, [facility], Xu Webb, CNP, Emily Womack, CNP, Hoa Earl, CNP, Ricky Ornelas, CNP, Gayathri Good, CNP, Horace Huggins, PAAngelinaC, Rogelio Díaz, CNS, Yasmeen Norris, CNP, Julito Yeung, CNP         Whitney Pitts [address]    Second Visit Note  For more detailed information please refer to the progress note of the day      5/15/2023    4:38 PM    Name:   Becky Dean  MRN:     [id number]     [place]:      [de-identified]   Room:   [address] [place] [address] Day:  1  Admit Date:  5/14/2023  8:51 PM    PCP:   No primary care provider on file.   Code Status:  Full Code      Pt vitals were reviewed   New labs were reviewed   Patient was seen, she feels much better  Sob improved  No chest pain    Exam  Patient is alert and oriented  Clear to auscultation bilaterally,occasional basal crackles  Regular rate and rhythm  No abdominal tenderness, no organomegaly  trace pedal edema      Updated plan :     Resume goal directed medical therapy for systolic heart failure  Patient responding well to diuresis  She is from [place] and needs pcp and cardio referrals here  Possible discharge tomorrow      Win Saucedo MD  5/15/2023  4:38 PM
[facility]  Office: [address], DO, Montserrat Frederic, DO, Marlys Baptiste, DO, Quiana Levin, DO, Renato Mcgregor MD, Huma Ordonez MD, Justino Kraus MD, Becky Blood MD,  Hilario Zhang MD, Anila Bronson MD, Derek Bro, DO, Ramesh Huerta MD,  Rodrigo Epps MD, Lottie Cook MD, Verenice Mota DO, Sherine Smiley MD, Rosemary Ruiz MD, Milagros Bender DO, Robert Nielsen MD, Christine Phan MD, Olivia Chavez MD, Cody Watkins MD,  Elsa Jenkins DO, Kandi Cardenas MD,  Kell Mg, CNP,  Patricia Mcrae, CNP, Zaid Calderon, CNP, Renetta Rosario, CNP,  Ibis Ellington, [facility], Ge Cotto, CNP, Karen Ledezma, CNP, Shanell James, CNP, Jorge A Mancini, CNP, Cori Wood, CNP, Fe Montesinos PA-C, Alfredito Montilla, [facility], Efrem Corral, CNP, Jorge Bain, CNP         Whitney [address]    Progress Note    5/16/2023    10:41 AM    Name:   Nikkie Pimentel  MRN:     [id number]     Acct:      [de-identified]   Room:   [address] [place] [address] Day:  2  Admit Date:  5/14/2023  8:51 PM    PCP:   No primary care provider on file. Code Status:  Full Code    Subjective:     C/C:   Chief Complaint   Patient presents with    Shortness of Breath    Fatigue     Pt has CHF     Interval History Status: improved. Feels much better  Much less sob  Still has occasional chest pains-burning    Has had negative cath in past and has nicm    Brief History:     Per my partner:  Lima Lai is a 62 y.o. Non- / non  female who presents with Shortness of Breath and Fatigue (Pt has CHF)   and is admitted to the hospital for the management of Acute decompensated heart failure ([facility] [address]      63-year-old female with a past medical history of CHF with reduced ejection fraction, hypertension, and iron-deficiency anemia presents to the emergency department for shortness of breath, dyspnea exertion, and bilateral lower extremity
Rn said there is no need to get vitals on the patient.
Verbal  Barriers to Learning: None  Education Outcome: Verbalized understanding      Therapy Time   Individual Concurrent Group Co-treatment   Time In 0917         Time Out 0932         Minutes 15         Timed Code Treatment Minutes: 8 Minutes       Joanna Khoury PT

## 2023-05-16 NOTE — ADT AUTH CERT
at baseline or improved    5/15/2023 3:20 PM EDT by Jack Clark    (X) * Pulmonary edema absent or improved    5/15/2023 3:20 PM EDT by Tabatha [facility]      WNL    TRACE EDEMA BLE's    Activity    (X) Advance activity as tolerated    Routes    (X) Oral diet    5/15/2023 3:20 PM EDT by Tabatha [facility]      ADULT DIET; Regular; No Added Salt (3-4 gm); 1500 ml    Interventions    (X) * Pulmonary catheter absent    (X) Possible electrolytes    5/15/2023 3:20 PM EDT by Tabatha [facility]      K 3.5    (X) Possible CXR, ECG, BNP    5/15/2023 3:20 PM EDT by Tabatha [facility]      BNP 3717    Medications    (X) Diuretics    5/15/2023 3:20 PM EDT by Tabatha [facility]      LASIX 40MG IV BID    (X) Neprilysin inhibitor with ARB, or ACE inhibitor or ARB    5/15/2023 3:20 PM EDT by Tabatha [facility]      sacubitril-valsartan (ENTRESTO) 49-51 MG per tablet 1 tablet  Dose: 1 tablet  Freq: 2 TIMES DAILY Route: PO    (X) Beta-blocker    5/15/2023 3:20 PM EDT by Tabatha [facility]      COREG 25MG PO BID    (X) Possible aldosterone antagonist    5/15/2023 3:20 PM EDT by Tabatha [facility]      ALDACTONE 25MG PO DAILY    * Milestone   Additional Notes   DATE: 5/15 CARE DAY 2         RELEVANT BASELINES: (lab values, vitals, o2 amount/delivery, etc.)   No home O2         PERTINENT UPDATES:   Iv lasix BID         MD CONSULTS/ASSESSMENT AND PLAN:   ===MEDICINE   1. Acute on chronic CHF exacerbation-proBNP 4735 from 3056 in 2020. Continue patient beta-blocker, Aldactone, and Entresto. Lasix twice daily. Strict I's and O's. Daily weights. Sodium and fluid restriction. Cardiology consultation. 2. Acute hypoxia-on 2 L nasal cannula. Secondary to #1. Continue as #1. Wean oxygen as tolerated. 3. Iron-deficiency anemia-continue ferrous sulfate supplements. Monitor CBC daily. 4. Essential hypertension-continue home medications.   Monitor vitals get 8 hours         MEDICATIONS:   potassium chloride, 20 mEq, Oral, BID WC         ORDERS:   Daily

## 2023-05-16 NOTE — CARE COORDINATION
Discharge [facility] Case Management Department  Written by: Sherine Newsome RN    Patient Name: Ivelisse Au  Attending Provider: Ty Levin DO  Admit Date: 2023  8:51 PM  MRN: [id number]  Account: [de-identified]                     : 1965  Discharge Date: 23      Disposition: home    Met with patient to discuss transitional planning. Plan is home independently. Patient has ride home. Patient agreeable to plan.      Sherine Newsome RN

## 2023-05-16 NOTE — DISCHARGE SUMMARY
[facility]  Office: [address], DO, Jason Yang, DO, Robin Leventhal, DO, Coleman Levin DO, Mimi Ybarra MD, Emily Otoole MD, Edvin Davis MD, Kerri Cuevas MD,  Lara Barkley MD, Aaron Lamas MD, Malka Kaba DO, Dominique Leon MD,  Neville Lim MD, Roxanne Bhatia MD, Mendez Robertson DO, Zac Kaiser MD, Richi Posadas MD, Misty Melara DO, Amy Mixon MD, Fabiola Mensah MD, Martin Yao MD, Genevieve Anderson MD,  Jennifer Gutierrez DO, Estefani Villanueva MD,  Barrera Green CNP,  Dallas Capps, CNP, Masha Delacruz, CNP, Mariam Lagos, CNP,  Edmundo Rascon, [facility], Lynne Link, CNP, Benjamín Gaspar, CNP, Pam Davies, CNP, Niecy Patel, CNP, Lianne Christian, CNP, Kyara Carreno PA-C, Esa Davis, CNS, Keny Cabrera, CNP, Devan Cardenas, CNP         Whitney Fuentes [place] [address]    Discharge Summary     Patient ID: Suzie Kaba  :  1965   MRN: [id number]     ACCOUNT:  [de-identified]   Patient's PCP: No primary care provider on file. Admit Date: 2023   Discharge Date: 2023     Length of Stay: 2  Code Status:  Full Code  Admitting Physician: Marietta Jarvis MD  Discharge Physician: Liya Levin DO     Active Discharge Diagnoses:     Hospital Problem Lists:  Principal Problem:    Acute on chronic systolic heart failure ([facility] [address]  Active Problems:    Primary hypertension    Shortness of breath    Acute decompensated heart failure ([facility] [address]    AICD (automatic cardioverter/defibrillator) present  Resolved Problems:    * No resolved hospital problems. *      Admission Condition:  fair     Discharged Condition: stable    Hospital Stay:     Hospital Course:  Suzie Kaba is a 62 y.o. female who was admitted for the management of   Acute on chronic systolic heart failure ([facility] [address] , presented to ER with Shortness of Breath and Fatigue (Pt has CHF)    Admitted with chf exac-known low ef.   Repeat

## 2023-05-16 NOTE — PLAN OF CARE
Problem: Discharge Planning  Goal: Discharge to home or other facility with appropriate resources  5/15/2023 1704 by Bhavik Rodrigez RN  Outcome: Adequate for Discharge     Problem: Chronic Conditions and Co-morbidities  Goal: Patient's chronic conditions and co-morbidity symptoms are monitored and maintained or improved  5/15/2023 2224 by Kashif Monahan RN  Outcome: Progressing  Flowsheets (Taken 5/15/2023 2224)  Care Plan - Patient's Chronic Conditions and Co-Morbidity Symptoms are Monitored and Maintained or Improved: Monitor and assess patient's chronic conditions and comorbid symptoms for stability, deterioration, or improvement  5/15/2023 1705 by Bhavik Rodrigez RN  Outcome: Progressing  5/15/2023 1704 by Bhavik Rodrigez RN  Outcome: Progressing

## 2023-05-17 LAB
EKG ATRIAL RATE: 75 BPM
EKG P AXIS: 35 DEGREES
EKG P-R INTERVAL: 170 MS
EKG Q-T INTERVAL: 458 MS
EKG QRS DURATION: 126 MS
EKG QTC CALCULATION (BAZETT): 511 MS
EKG R AXIS: -9 DEGREES
EKG T AXIS: 114 DEGREES
EKG VENTRICULAR RATE: 75 BPM

## 2023-08-05 ENCOUNTER — HOSPITAL ENCOUNTER (EMERGENCY)
Age: 58
Discharge: HOME | End: 2023-08-05
Attending: EMERGENCY MEDICINE
Payer: MEDICARE

## 2023-08-05 ENCOUNTER — APPOINTMENT (OUTPATIENT)
Dept: VASCULAR LAB | Age: 58
End: 2023-08-05
Payer: MEDICARE

## 2023-08-05 ENCOUNTER — APPOINTMENT (OUTPATIENT)
Dept: GENERAL RADIOLOGY | Age: 58
End: 2023-08-05
Payer: MEDICARE

## 2023-08-05 VITALS
HEIGHT: 63 IN | DIASTOLIC BLOOD PRESSURE: 87 MMHG | TEMPERATURE: 97.8 F | RESPIRATION RATE: 16 BRPM | BODY MASS INDEX: 35.44 KG/M2 | HEART RATE: 86 BPM | WEIGHT: 200 LBS | SYSTOLIC BLOOD PRESSURE: 125 MMHG | OXYGEN SATURATION: 98 %

## 2023-08-05 DIAGNOSIS — M79.89 LEFT LEG SWELLING: Primary | ICD-10-CM

## 2023-08-05 LAB — ECHO BSA: 2.01 M2

## 2023-08-05 PROCEDURE — 99284 EMERGENCY DEPT VISIT MOD MDM: CPT

## 2023-08-05 PROCEDURE — 93971 EXTREMITY STUDY: CPT

## 2023-08-05 PROCEDURE — 73610 X-RAY EXAM OF ANKLE: CPT

## 2023-08-05 PROCEDURE — 73562 X-RAY EXAM OF KNEE 3: CPT

## 2023-08-05 ASSESSMENT — LIFESTYLE VARIABLES: HOW OFTEN DO YOU HAVE A DRINK CONTAINING ALCOHOL: NEVER

## 2023-08-05 NOTE — ED NOTES
Patient returned from doppler via [address] [facility], [address]  08/05/23 8473

## 2023-08-05 NOTE — DISCHARGE INSTRUCTIONS
You have been seen in the ER today for leg pain. You do not have a blood clot in your legs, you will need to follow-up with your primary care provider within the next few days. If you do not have one, please call and schedule an appointment and establish a primary care provider. If you begin to experience any symptoms such as chest pain shortness of breath nausea vomiting dizziness drowsiness abdominal pain loss of consciousness or any other symptoms you find concerning please return to the ED for follow-up evaluation. If you have been given pain medication please take them only as prescribed. Do not take more medication than prescribed at any given time. Please follow-up with your primary care provider within 3-5 days for continued care, sooner if you have concerns.

## 2024-09-06 ENCOUNTER — HOSPITAL ENCOUNTER (EMERGENCY)
Age: 59
Discharge: HOME | End: 2024-09-06
Attending: EMERGENCY MEDICINE
Payer: MEDICARE

## 2024-09-06 ENCOUNTER — APPOINTMENT (OUTPATIENT)
Dept: GENERAL RADIOLOGY | Age: 59
End: 2024-09-06
Payer: MEDICARE

## 2024-09-06 VITALS
BODY MASS INDEX: 37.92 KG/M2 | DIASTOLIC BLOOD PRESSURE: 83 MMHG | HEIGHT: 63 IN | RESPIRATION RATE: 20 BRPM | SYSTOLIC BLOOD PRESSURE: 118 MMHG | TEMPERATURE: 97.3 F | HEART RATE: 83 BPM | OXYGEN SATURATION: 99 % | WEIGHT: 214 LBS

## 2024-09-06 DIAGNOSIS — R06.00 DYSPNEA, UNSPECIFIED TYPE: Primary | ICD-10-CM

## 2024-09-06 LAB
ANION GAP SERPL CALCULATED.3IONS-SCNC: 13 MMOL/L (ref 9–17)
BASOPHILS # BLD: 0.06 K/UL (ref 0–0.2)
BASOPHILS NFR BLD: 1 % (ref 0–2)
BNP SERPL-MCNC: 2969 PG/ML
BUN SERPL-MCNC: 14 MG/DL (ref 6–20)
BUN/CREAT SERPL: 13 (ref 9–20)
CALCIUM SERPL-MCNC: 9.1 MG/DL (ref 8.6–10.4)
CHLORIDE SERPL-SCNC: 103 MMOL/L (ref 98–107)
CO2 SERPL-SCNC: 23 MMOL/L (ref 20–31)
CREAT SERPL-MCNC: 1.1 MG/DL (ref 0.5–0.9)
EKG ATRIAL RATE: 76 BPM
EKG P AXIS: 36 DEGREES
EKG P-R INTERVAL: 188 MS
EKG Q-T INTERVAL: 452 MS
EKG QRS DURATION: 128 MS
EKG QTC CALCULATION (BAZETT): 508 MS
EKG R AXIS: -22 DEGREES
EKG T AXIS: 106 DEGREES
EKG VENTRICULAR RATE: 76 BPM
EOSINOPHIL # BLD: 0.5 K/UL (ref 0–0.44)
EOSINOPHILS RELATIVE PERCENT: 7 % (ref 1–4)
ERYTHROCYTE [DISTWIDTH] IN BLOOD BY AUTOMATED COUNT: 15.1 % (ref 11.8–14.4)
GFR, ESTIMATED: 58 ML/MIN/1.73M2
GLUCOSE SERPL-MCNC: 125 MG/DL (ref 70–99)
HCT VFR BLD AUTO: 39.7 % (ref 36.3–47.1)
HGB BLD-MCNC: 12.6 G/DL (ref 11.9–15.1)
IMM GRANULOCYTES # BLD AUTO: 0.02 K/UL (ref 0–0.3)
IMM GRANULOCYTES NFR BLD: 0 %
LYMPHOCYTES NFR BLD: 1.83 K/UL (ref 1.1–3.7)
LYMPHOCYTES RELATIVE PERCENT: 25 % (ref 24–43)
MAGNESIUM SERPL-MCNC: 1.8 MG/DL (ref 1.6–2.6)
MCH RBC QN AUTO: 29.2 PG (ref 25.2–33.5)
MCHC RBC AUTO-ENTMCNC: 31.7 G/DL (ref 28.4–34.8)
MCV RBC AUTO: 92.1 FL (ref 82.6–102.9)
MONOCYTES NFR BLD: 0.43 K/UL (ref 0.1–1.2)
MONOCYTES NFR BLD: 6 % (ref 3–12)
NEUTROPHILS NFR BLD: 61 % (ref 36–65)
NEUTS SEG NFR BLD: 4.39 K/UL (ref 1.5–8.1)
NRBC BLD-RTO: 0 PER 100 WBC
PLATELET # BLD AUTO: 183 K/UL (ref 138–453)
PMV BLD AUTO: 11.4 FL (ref 8.1–13.5)
POTASSIUM SERPL-SCNC: 4.3 MMOL/L (ref 3.7–5.3)
RBC # BLD AUTO: 4.31 M/UL (ref 3.95–5.11)
RBC # BLD: ABNORMAL 10*6/UL
SODIUM SERPL-SCNC: 139 MMOL/L (ref 135–144)
TROPONIN I SERPL HS-MCNC: <6 NG/L (ref 0–14)
WBC OTHER # BLD: 7.2 K/UL (ref 3.5–11.3)

## 2024-09-06 PROCEDURE — 6360000002 HC RX W HCPCS: Performed by: EMERGENCY MEDICINE

## 2024-09-06 PROCEDURE — 93005 ELECTROCARDIOGRAM TRACING: CPT | Performed by: EMERGENCY MEDICINE

## 2024-09-06 PROCEDURE — 80048 BASIC METABOLIC PNL TOTAL CA: CPT

## 2024-09-06 PROCEDURE — 96374 THER/PROPH/DIAG INJ IV PUSH: CPT

## 2024-09-06 PROCEDURE — 99285 EMERGENCY DEPT VISIT HI MDM: CPT

## 2024-09-06 PROCEDURE — 84484 ASSAY OF TROPONIN QUANT: CPT

## 2024-09-06 PROCEDURE — 83880 ASSAY OF NATRIURETIC PEPTIDE: CPT

## 2024-09-06 PROCEDURE — 85025 COMPLETE CBC W/AUTO DIFF WBC: CPT

## 2024-09-06 PROCEDURE — 83735 ASSAY OF MAGNESIUM: CPT

## 2024-09-06 PROCEDURE — 71045 X-RAY EXAM CHEST 1 VIEW: CPT

## 2024-09-06 RX ORDER — FUROSEMIDE 10 MG/ML
40 INJECTION INTRAMUSCULAR; INTRAVENOUS ONCE
Status: COMPLETED | OUTPATIENT
Start: 2024-09-06 | End: 2024-09-06

## 2024-09-06 RX ADMIN — FUROSEMIDE 40 MG: 10 INJECTION, SOLUTION INTRAMUSCULAR; INTRAVENOUS at 19:49

## 2024-09-06 ASSESSMENT — ENCOUNTER SYMPTOMS
EYE DISCHARGE: 0
BACK PAIN: 0
EYE PAIN: 0
ABDOMINAL DISTENTION: 0
SHORTNESS OF BREATH: 1
ABDOMINAL PAIN: 0
CHEST TIGHTNESS: 0
FACIAL SWELLING: 0

## 2024-09-06 ASSESSMENT — PAIN - FUNCTIONAL ASSESSMENT: PAIN_FUNCTIONAL_ASSESSMENT: NONE - DENIES PAIN

## 2024-09-06 NOTE — ED PROVIDER NOTES
EMERGENCY DEPARTMENT ENCOUNTER    Pt Name: Jen Rdz  MRN: [id number]  Birthdate 1965  Date of evaluation: 9/6/24  CHIEF COMPLAINT       Chief Complaint   Patient presents with    Shortness of Breath     Pt reports SOB for 1-2 days. Reports hx of CHF. Denies chest pain.     HISTORY OF PRESENT ILLNESS   HPI   The patient is a 58-year-old female with a history of CHF and hypertension who presented to the emergency department secondary to shortness of breath.  Patient complains of 2 days of increased shortness of breath.  Patient had previous history of COPD not on oxygen.  Patient did admit to eating high sodium foods over the last several days.  She is taking her diuretic.  Patient denies any concern for COVID and does not want to be tested for COVID. Patient denies chest pain, nausea, vomiting, fevers or chills      REVIEW OF SYSTEMS     Review of Systems   Constitutional:  Negative for chills, diaphoresis and fever.   HENT:  Negative for congestion, ear pain and facial swelling.    Eyes:  Negative for pain, discharge and visual disturbance.   Respiratory:  Positive for shortness of breath. Negative for chest tightness.    Cardiovascular:  Negative for chest pain and palpitations.   Gastrointestinal:  Negative for abdominal distention and abdominal pain.   Genitourinary:  Negative for difficulty urinating and flank pain.   Musculoskeletal:  Negative for back pain.   Skin:  Negative for wound.   Neurological:  Negative for dizziness, light-headedness and headaches.     PASTMEDICAL HISTORY     Past Medical History:   Diagnosis Date    CHF (congestive heart failure) ([facility])     Hypertension     NICM (nonischemic cardiomyopathy) ([facility])     Noncompliance      Past Problem List  Patient Active Problem List   Diagnosis Code    Primary hypertension I10    Chest pain R07.9    Pulmonary edema cardiac cause (HCC) I50.1    Acute on chronic systolic heart failure (HCC) I50.23    Shortness of breath R06.02    Acute  acute respiratory distress.  Patient will be reevaluated  Slightly elevated BNP at the labs within normal limits.  Patient received a dose of Lasix in the emergency department.  No acute respiratory distress.  Discharged home with outpatient follow-up given parameters to return to the emergency    Disposition discussion with patient/family, Shared Decision Making:  yes    Case discussed with consulting clinician:  MIGUE    MIPS:  NA    Social determinants of health impacting treatment or disposition:  none    Code Status Discussion:  full code    CRITICAL CARE:       PROCEDURES:    Procedures         DATA FOR LAB AND RADIOLOGY TESTS ORDERED BELOW ARE REVIEWED BY THE ED CLINICIAN:    RADIOLOGY: All x-rays, CT, MRI, and formal ultrasound images (except ED bedside ultrasound) are read by the radiologist, see reports below, unless otherwise noted in MDM or here.  Reports below are reviewed by myself.  XR CHEST PORTABLE   Final Result   Cardiomegaly with pulmonary vascular congestion/fluid overload.             LABS: Lab orders shown below, the results are reviewed by myself, and all abnormals are listed below.  Labs Reviewed   BASIC METABOLIC PANEL - Abnormal; Notable for the following components:       Result Value    Glucose 125 (*)     Creatinine 1.1 (*)     Est, Glom Filt Rate 58 (*)     All other components within normal limits   CBC WITH AUTO DIFFERENTIAL - Abnormal; Notable for the following components:    RDW 15.1 (*)     Eosinophils % 7 (*)     Eosinophils Absolute 0.50 (*)     All other components within normal limits   BRAIN NATRIURETIC PEPTIDE - Abnormal; Notable for the following components:    Pro-BNP 2,969 (*)     All other components within normal limits   MAGNESIUM   TROPONIN       Vitals Reviewed:    Vitals:    09/06/24 1636 09/06/24 1949   BP: (!) 133/94 118/83   Pulse: 83    Resp: 20    Temp: 97.3 °F (36.3 °C)    TempSrc: Oral    SpO2: 99%    Weight: 97.1 kg (214 lb)    Height: 1.6 m (5' 3\")

## 2024-09-09 LAB
EKG ATRIAL RATE: 76 BPM
EKG P AXIS: 36 DEGREES
EKG P-R INTERVAL: 188 MS
EKG Q-T INTERVAL: 452 MS
EKG QRS DURATION: 128 MS
EKG QTC CALCULATION (BAZETT): 508 MS
EKG R AXIS: -22 DEGREES
EKG T AXIS: 106 DEGREES
EKG VENTRICULAR RATE: 76 BPM

## 2024-09-09 PROCEDURE — 93010 ELECTROCARDIOGRAM REPORT: CPT | Performed by: INTERNAL MEDICINE

## 2024-10-15 ENCOUNTER — HOSPITAL ENCOUNTER (EMERGENCY)
Age: 59
Discharge: HOME | End: 2024-10-15
Attending: EMERGENCY MEDICINE
Payer: MEDICARE

## 2024-10-15 VITALS
DIASTOLIC BLOOD PRESSURE: 107 MMHG | OXYGEN SATURATION: 98 % | TEMPERATURE: 98.7 F | WEIGHT: 214 LBS | HEIGHT: 66 IN | HEART RATE: 100 BPM | BODY MASS INDEX: 34.39 KG/M2 | SYSTOLIC BLOOD PRESSURE: 145 MMHG | RESPIRATION RATE: 18 BRPM

## 2024-10-15 DIAGNOSIS — K02.9 DENTAL CARIES: ICD-10-CM

## 2024-10-15 DIAGNOSIS — K08.89 PAIN, DENTAL: Primary | ICD-10-CM

## 2024-10-15 PROCEDURE — 99283 EMERGENCY DEPT VISIT LOW MDM: CPT

## 2024-10-15 RX ORDER — TRAMADOL HYDROCHLORIDE 50 MG/1
50 TABLET ORAL EVERY 8 HOURS PRN
Qty: 3 TABLET | Refills: 0 | Status: SHIPPED | OUTPATIENT
Start: 2024-10-15 | End: 2024-10-16

## 2024-10-15 RX ORDER — PENICILLIN V POTASSIUM 500 MG/1
500 TABLET, FILM COATED ORAL 4 TIMES DAILY
Qty: 40 TABLET | Refills: 0 | Status: SHIPPED | OUTPATIENT
Start: 2024-10-15 | End: 2024-10-25

## 2024-10-15 ASSESSMENT — PAIN SCALES - GENERAL: PAINLEVEL_OUTOF10: 10

## 2024-10-15 ASSESSMENT — PAIN - FUNCTIONAL ASSESSMENT: PAIN_FUNCTIONAL_ASSESSMENT: 0-10

## 2024-10-15 ASSESSMENT — LIFESTYLE VARIABLES
HOW OFTEN DO YOU HAVE A DRINK CONTAINING ALCOHOL: NEVER
HOW MANY STANDARD DRINKS CONTAINING ALCOHOL DO YOU HAVE ON A TYPICAL DAY: PATIENT DOES NOT DRINK

## 2024-10-15 NOTE — DISCHARGE INSTRUCTIONS
Please follow up with the dentist.  Return to the ED if you develop any increased dental pain, facial swelling, neck pain, trouble swallowing, fever, chills, vomiting, or any other concerning symptoms. Do not operate machinery or drink alcohol while taking tramadol. Can cause drowsiness.

## 2024-10-15 NOTE — ED NOTES
Mode of arrival (squad #, walk in, police, etc) : Walk-in        Chief complaint(s): Dental pain        Arrival Note (brief scenario, treatment PTA, etc).: Pt arrived to the ED with c/o dental pain x1 day. Pt states she has broken teeth. Pt tried oral gel and ibuprofen but did not help. Pt states she has an appointment on Thursday.        C= \"Have you ever felt that you should Cut down on your drinking?\"  No  A= \"Have people Annoyed you by criticizing your drinking?\"  No  G= \"Have you ever felt bad or Guilty about your drinking?\"  No  E= \"Have you ever had a drink as an Eye-opener first thing in the morning to steady your nerves or to help a hangover?\"  No      Deferred []      Reason for deferring: N/A    *If yes to two or more: probable alcohol abuse.*

## 2024-10-15 NOTE — ED PROVIDER NOTES
[facility] ED  eMERGENCY dEPARTMENT eNCOUnter   Independent Attestation     Pt Name: Jen Rdz  MRN: [id number]  Birthdate 1965  Date of evaluation: 10/15/24       Jen Rdz is a 58 y.o. female who presents with Dental Pain (X1 day)        Based on the medical record, the care appears appropriate. I was personally available for consultation in the Emergency Department.    Damian Berger MD  Attending Emergency  Physician                Damian Berger MD  10/15/24 2228    
History: none otherwise stated in nurses notes    Allergies:has No Known Allergies.      PHYSICAL EXAM     INITIAL VITALS: BP (!) 145/107   Pulse 100   Temp 98.7 °F (37.1 °C)   Resp 18   Ht 1.676 m (5' 6\")   Wt 97.1 kg (214 lb)   LMP 12/11/2014 (Approximate)   SpO2 98%   BMI 34.54 kg/m²   CONSTITUTIONAL: Vital signs reviewed, Alert and oriented X 3.   HEAD: Atraumatic, Normocephalic.   EYES: Eyes are normal to inspection, Pupils equal, round and reactive to light.   NECK: Normal ROM, No jugular venous distention, No meningeal signs, Cervical spine nontender.   MOUTH:  + dental pain at 8, 9 and 26 with decay, with no signs of abscess formation or Ford sign noted.  No swelling involving the airway at all.  No trismus.  Lips are normal.  No tongue elevation.  No tenderness over floor of mouth.  Controlling secretions.  Speaking in full sentences.  RESPIRATORY CHEST: No respiratory distress.   ABDOMEN: Abdomen is nontender, No distension.   UPPER EXTREMITY: Inspection normal, No cyanosis.   NEURO: GCS is 15, Speech normal, Memory normal.   SKIN: Skin is warm, Skin is dry.   PSYCHIATRIC: Oriented X 3, Normal affect.       EMERGENCY DEPARTMENT COURSE:   Intermittent dental pain.  Has very poor dentition.  Severe decay noted.  No signs of dental abscess, ludwigs angina or angioedema.  Will provide a short course of tramadol for pain.  Pt also started on PCN VK.    Pt provided with dental clinic list and instructed to call as soon as possible for an appointment.   Instructed to return for worsening or any new symptoms including throat swelling, difficulty swallowing or breathing. Pt agrees.   The care is provided during an unprecedented national emergency due to the novel coronavirus, COVID-19.    FINAL IMPRESSION:     1. Pain, dental    2. Dental caries          DISPOSITION:  DISPOSITION Decision To Discharge 10/15/2024 04:02:57 PM  Condition at Disposition: Data Unavailable        PATIENT REFERRED TO:  No

## 2025-01-23 NOTE — ED NOTES
Pt ambulated to bathroom with steady gait. NAD noted.       Desma Cockayne, RN  09/01/18 0623 You can access the FollowMyHealth Patient Portal offered by [facility] by registering at the following website: [email or web address]. By joining [employer]’s FollowMyHealth portal, you will also be able to view your health information using other applications (apps) compatible with our system.

## 2025-04-13 ENCOUNTER — HOSPITAL ENCOUNTER (INPATIENT)
Age: 60
LOS: 2 days | Discharge: HOME | DRG: 554 | End: 2025-04-15
Attending: EMERGENCY MEDICINE | Admitting: INTERNAL MEDICINE
Payer: MEDICARE

## 2025-04-13 ENCOUNTER — APPOINTMENT (OUTPATIENT)
Dept: GENERAL RADIOLOGY | Age: 60
DRG: 554 | End: 2025-04-13
Payer: MEDICARE

## 2025-04-13 ENCOUNTER — SURGICAL CONSULT (OUTPATIENT)
Dept: ORTHOPEDIC SURGERY | Age: 60
End: 2025-04-13

## 2025-04-13 DIAGNOSIS — R26.2 UNABLE TO AMBULATE: ICD-10-CM

## 2025-04-13 DIAGNOSIS — M25.562 ACUTE PAIN OF LEFT KNEE: Primary | ICD-10-CM

## 2025-04-13 DIAGNOSIS — M25.562 CHRONIC PAIN OF LEFT KNEE: Primary | ICD-10-CM

## 2025-04-13 DIAGNOSIS — G89.29 CHRONIC PAIN OF LEFT KNEE: Primary | ICD-10-CM

## 2025-04-13 PROBLEM — Z86.718 HISTORY OF DVT (DEEP VEIN THROMBOSIS): Status: ACTIVE | Noted: 2025-04-13

## 2025-04-13 LAB
ANION GAP SERPL CALCULATED.3IONS-SCNC: 13 MMOL/L (ref 9–16)
BASOPHILS # BLD: 0.04 K/UL (ref 0–0.2)
BASOPHILS NFR BLD: 0 % (ref 0–2)
BUN SERPL-MCNC: 9 MG/DL (ref 6–20)
CALCIUM SERPL-MCNC: 9.7 MG/DL (ref 8.6–10.4)
CHLORIDE SERPL-SCNC: 103 MMOL/L (ref 98–107)
CO2 SERPL-SCNC: 25 MMOL/L (ref 20–31)
CREAT SERPL-MCNC: 0.9 MG/DL (ref 0.5–0.9)
EOSINOPHIL # BLD: 0.14 K/UL (ref 0–0.44)
EOSINOPHILS RELATIVE PERCENT: 2 % (ref 1–4)
ERYTHROCYTE [DISTWIDTH] IN BLOOD BY AUTOMATED COUNT: 14.6 % (ref 11.8–14.4)
GFR, ESTIMATED: 77 ML/MIN/1.73M2
GLUCOSE SERPL-MCNC: 137 MG/DL (ref 74–99)
HCT VFR BLD AUTO: 40.2 % (ref 36.3–47.1)
HGB BLD-MCNC: 13.3 G/DL (ref 11.9–15.1)
IMM GRANULOCYTES # BLD AUTO: 0.04 K/UL (ref 0–0.3)
IMM GRANULOCYTES NFR BLD: 0 %
INR PPP: 1.4
LYMPHOCYTES NFR BLD: 1.15 K/UL (ref 1.1–3.7)
LYMPHOCYTES RELATIVE PERCENT: 12 % (ref 24–43)
MCH RBC QN AUTO: 28.5 PG (ref 25.2–33.5)
MCHC RBC AUTO-ENTMCNC: 33.1 G/DL (ref 28.4–34.8)
MCV RBC AUTO: 86.1 FL (ref 82.6–102.9)
MONOCYTES NFR BLD: 0.38 K/UL (ref 0.1–1.2)
MONOCYTES NFR BLD: 4 % (ref 3–12)
NEUTROPHILS NFR BLD: 82 % (ref 36–65)
NEUTS SEG NFR BLD: 7.83 K/UL (ref 1.5–8.1)
NRBC BLD-RTO: 0 PER 100 WBC
PLATELET # BLD AUTO: 215 K/UL (ref 138–453)
PMV BLD AUTO: 11.9 FL (ref 8.1–13.5)
POTASSIUM SERPL-SCNC: 3.7 MMOL/L (ref 3.7–5.3)
PROTHROMBIN TIME: 17.5 SEC (ref 11.5–14.2)
RBC # BLD AUTO: 4.67 M/UL (ref 3.95–5.11)
RBC # BLD: ABNORMAL 10*6/UL
SODIUM SERPL-SCNC: 141 MMOL/L (ref 136–145)
URATE SERPL-MCNC: 10.8 MG/DL (ref 2.4–5.7)
WBC OTHER # BLD: 9.6 K/UL (ref 3.5–11.3)

## 2025-04-13 PROCEDURE — 73562 X-RAY EXAM OF KNEE 3: CPT

## 2025-04-13 PROCEDURE — 6360000002 HC RX W HCPCS

## 2025-04-13 PROCEDURE — 6370000000 HC RX 637 (ALT 250 FOR IP)

## 2025-04-13 PROCEDURE — 6370000000 HC RX 637 (ALT 250 FOR IP): Performed by: EMERGENCY MEDICINE

## 2025-04-13 PROCEDURE — 99222 1ST HOSP IP/OBS MODERATE 55: CPT

## 2025-04-13 PROCEDURE — 6360000002 HC RX W HCPCS: Performed by: EMERGENCY MEDICINE

## 2025-04-13 PROCEDURE — 85025 COMPLETE CBC W/AUTO DIFF WBC: CPT

## 2025-04-13 PROCEDURE — 80048 BASIC METABOLIC PNL TOTAL CA: CPT

## 2025-04-13 PROCEDURE — 2500000003 HC RX 250 WO HCPCS

## 2025-04-13 PROCEDURE — 84550 ASSAY OF BLOOD/URIC ACID: CPT

## 2025-04-13 PROCEDURE — 36415 COLL VENOUS BLD VENIPUNCTURE: CPT

## 2025-04-13 PROCEDURE — 1200000000 HC SEMI PRIVATE

## 2025-04-13 PROCEDURE — 99285 EMERGENCY DEPT VISIT HI MDM: CPT

## 2025-04-13 PROCEDURE — 85610 PROTHROMBIN TIME: CPT

## 2025-04-13 PROCEDURE — 96372 THER/PROPH/DIAG INJ SC/IM: CPT

## 2025-04-13 RX ORDER — PRAVASTATIN SODIUM 20 MG
10 TABLET ORAL DAILY
Status: DISCONTINUED | OUTPATIENT
Start: 2025-04-13 | End: 2025-04-15 | Stop reason: HOSPADM

## 2025-04-13 RX ORDER — WARFARIN SODIUM 2.5 MG/1
2.5 TABLET ORAL DAILY
COMMUNITY

## 2025-04-13 RX ORDER — SPIRONOLACTONE 25 MG/1
25 TABLET ORAL DAILY
Status: DISCONTINUED | OUTPATIENT
Start: 2025-04-13 | End: 2025-04-15 | Stop reason: HOSPADM

## 2025-04-13 RX ORDER — ACETAMINOPHEN 325 MG/1
650 TABLET ORAL EVERY 6 HOURS PRN
Status: DISCONTINUED | OUTPATIENT
Start: 2025-04-13 | End: 2025-04-15 | Stop reason: HOSPADM

## 2025-04-13 RX ORDER — POLYETHYLENE GLYCOL 3350 17 G/17G
17 POWDER, FOR SOLUTION ORAL DAILY PRN
Status: DISCONTINUED | OUTPATIENT
Start: 2025-04-13 | End: 2025-04-15 | Stop reason: HOSPADM

## 2025-04-13 RX ORDER — ONDANSETRON 4 MG/1
4 TABLET, ORALLY DISINTEGRATING ORAL EVERY 8 HOURS PRN
Status: DISCONTINUED | OUTPATIENT
Start: 2025-04-13 | End: 2025-04-15 | Stop reason: HOSPADM

## 2025-04-13 RX ORDER — OXYCODONE HYDROCHLORIDE 5 MG/1
5 TABLET ORAL EVERY 4 HOURS PRN
Status: DISCONTINUED | OUTPATIENT
Start: 2025-04-13 | End: 2025-04-15 | Stop reason: HOSPADM

## 2025-04-13 RX ORDER — WARFARIN SODIUM 5 MG/1
5 TABLET ORAL DAILY
COMMUNITY

## 2025-04-13 RX ORDER — MAGNESIUM SULFATE IN WATER 40 MG/ML
2000 INJECTION, SOLUTION INTRAVENOUS PRN
Status: DISCONTINUED | OUTPATIENT
Start: 2025-04-13 | End: 2025-04-15 | Stop reason: HOSPADM

## 2025-04-13 RX ORDER — FUROSEMIDE 40 MG/1
40 TABLET ORAL 2 TIMES DAILY
Status: DISCONTINUED | OUTPATIENT
Start: 2025-04-13 | End: 2025-04-15 | Stop reason: HOSPADM

## 2025-04-13 RX ORDER — POTASSIUM CHLORIDE 7.45 MG/ML
10 INJECTION INTRAVENOUS PRN
Status: DISCONTINUED | OUTPATIENT
Start: 2025-04-13 | End: 2025-04-15 | Stop reason: HOSPADM

## 2025-04-13 RX ORDER — ENOXAPARIN SODIUM 100 MG/ML
40 INJECTION SUBCUTANEOUS DAILY
Status: CANCELLED | OUTPATIENT
Start: 2025-04-13

## 2025-04-13 RX ORDER — SODIUM CHLORIDE 0.9 % (FLUSH) 0.9 %
5-40 SYRINGE (ML) INJECTION EVERY 12 HOURS SCHEDULED
Status: DISCONTINUED | OUTPATIENT
Start: 2025-04-13 | End: 2025-04-15 | Stop reason: HOSPADM

## 2025-04-13 RX ORDER — ASPIRIN 81 MG/1
81 TABLET, CHEWABLE ORAL DAILY
Status: DISCONTINUED | OUTPATIENT
Start: 2025-04-13 | End: 2025-04-15 | Stop reason: HOSPADM

## 2025-04-13 RX ORDER — CARVEDILOL 25 MG/1
25 TABLET ORAL 2 TIMES DAILY WITH MEALS
Status: DISCONTINUED | OUTPATIENT
Start: 2025-04-13 | End: 2025-04-15 | Stop reason: HOSPADM

## 2025-04-13 RX ORDER — MORPHINE SULFATE 2 MG/ML
2 INJECTION, SOLUTION INTRAMUSCULAR; INTRAVENOUS ONCE
Status: COMPLETED | OUTPATIENT
Start: 2025-04-13 | End: 2025-04-13

## 2025-04-13 RX ORDER — SODIUM CHLORIDE 0.9 % (FLUSH) 0.9 %
5-40 SYRINGE (ML) INJECTION PRN
Status: DISCONTINUED | OUTPATIENT
Start: 2025-04-13 | End: 2025-04-15 | Stop reason: HOSPADM

## 2025-04-13 RX ORDER — OXYCODONE HYDROCHLORIDE 5 MG/1
10 TABLET ORAL EVERY 4 HOURS PRN
Status: DISCONTINUED | OUTPATIENT
Start: 2025-04-13 | End: 2025-04-15 | Stop reason: HOSPADM

## 2025-04-13 RX ORDER — ONDANSETRON 2 MG/ML
4 INJECTION INTRAMUSCULAR; INTRAVENOUS EVERY 6 HOURS PRN
Status: DISCONTINUED | OUTPATIENT
Start: 2025-04-13 | End: 2025-04-15 | Stop reason: HOSPADM

## 2025-04-13 RX ORDER — FERROUS SULFATE 325(65) MG
325 TABLET, DELAYED RELEASE (ENTERIC COATED) ORAL
Status: DISCONTINUED | OUTPATIENT
Start: 2025-04-14 | End: 2025-04-15 | Stop reason: HOSPADM

## 2025-04-13 RX ORDER — ACETAMINOPHEN 650 MG/1
650 SUPPOSITORY RECTAL EVERY 6 HOURS PRN
Status: DISCONTINUED | OUTPATIENT
Start: 2025-04-13 | End: 2025-04-15 | Stop reason: HOSPADM

## 2025-04-13 RX ORDER — COLCHICINE 0.6 MG/1
0.6 TABLET ORAL DAILY
Status: DISCONTINUED | OUTPATIENT
Start: 2025-04-14 | End: 2025-04-15 | Stop reason: HOSPADM

## 2025-04-13 RX ORDER — SODIUM CHLORIDE 9 MG/ML
INJECTION, SOLUTION INTRAVENOUS PRN
Status: DISCONTINUED | OUTPATIENT
Start: 2025-04-13 | End: 2025-04-15 | Stop reason: HOSPADM

## 2025-04-13 RX ORDER — ISOSORBIDE MONONITRATE 60 MG/1
60 TABLET, EXTENDED RELEASE ORAL DAILY
Status: DISCONTINUED | OUTPATIENT
Start: 2025-04-13 | End: 2025-04-13

## 2025-04-13 RX ORDER — DEXAMETHASONE SODIUM PHOSPHATE 4 MG/ML
4 INJECTION, SOLUTION INTRA-ARTICULAR; INTRALESIONAL; INTRAMUSCULAR; INTRAVENOUS; SOFT TISSUE EVERY 6 HOURS
Status: DISCONTINUED | OUTPATIENT
Start: 2025-04-13 | End: 2025-04-15

## 2025-04-13 RX ORDER — POTASSIUM CHLORIDE 1500 MG/1
40 TABLET, EXTENDED RELEASE ORAL PRN
Status: DISCONTINUED | OUTPATIENT
Start: 2025-04-13 | End: 2025-04-15 | Stop reason: HOSPADM

## 2025-04-13 RX ADMIN — CARVEDILOL 25 MG: 25 TABLET, FILM COATED ORAL at 18:43

## 2025-04-13 RX ADMIN — DEXAMETHASONE SODIUM PHOSPHATE 4 MG: 4 INJECTION INTRA-ARTICULAR; INTRALESIONAL; INTRAMUSCULAR; INTRAVENOUS; SOFT TISSUE at 19:25

## 2025-04-13 RX ADMIN — MORPHINE SULFATE 2 MG: 2 INJECTION, SOLUTION INTRAMUSCULAR; INTRAVENOUS at 14:11

## 2025-04-13 RX ADMIN — PRAVASTATIN SODIUM 10 MG: 20 TABLET ORAL at 19:25

## 2025-04-13 RX ADMIN — OXYCODONE HYDROCHLORIDE 10 MG: 5 TABLET ORAL at 21:07

## 2025-04-13 RX ADMIN — DEXAMETHASONE 10 MG: 2 TABLET ORAL at 14:11

## 2025-04-13 RX ADMIN — ASPIRIN 81 MG: 81 TABLET, CHEWABLE ORAL at 18:43

## 2025-04-13 RX ADMIN — SACUBITRIL AND VALSARTAN 1 TABLET: 49; 51 TABLET, FILM COATED ORAL at 21:07

## 2025-04-13 RX ADMIN — SODIUM CHLORIDE, PRESERVATIVE FREE 10 ML: 5 INJECTION INTRAVENOUS at 19:25

## 2025-04-13 RX ADMIN — FUROSEMIDE 40 MG: 40 TABLET ORAL at 18:43

## 2025-04-13 ASSESSMENT — PAIN - FUNCTIONAL ASSESSMENT
PAIN_FUNCTIONAL_ASSESSMENT: PREVENTS OR INTERFERES WITH MANY ACTIVE NOT PASSIVE ACTIVITIES
PAIN_FUNCTIONAL_ASSESSMENT: 0-10

## 2025-04-13 ASSESSMENT — PAIN SCALES - GENERAL
PAINLEVEL_OUTOF10: 10
PAINLEVEL_OUTOF10: 10
PAINLEVEL_OUTOF10: 7

## 2025-04-13 ASSESSMENT — PAIN DESCRIPTION - DESCRIPTORS: DESCRIPTORS: ACHING

## 2025-04-13 ASSESSMENT — PAIN DESCRIPTION - ORIENTATION: ORIENTATION: LEFT

## 2025-04-13 ASSESSMENT — PAIN DESCRIPTION - LOCATION: LOCATION: KNEE

## 2025-04-13 NOTE — ED PROVIDER NOTES
dexAMETHasone (DECADRON) tablet 10 mg     DISCHARGE PRESCRIPTIONS:  New Prescriptions    No medications on file     PHYSICIAN CONSULTS ORDERED THIS ENCOUNTER:  IP CONSULT TO INTERNAL MEDICINE  FINAL IMPRESSION      1. Chronic pain of left knee    2. Unable to ambulate          DISPOSITION/PLAN   DISPOSITION Decision To Admit 04/13/2025 03:34:24 PM   DISPOSITION CONDITION Stable           OUTPATIENT FOLLOW UP THE PATIENT:  No follow-up provider specified.    DO Nehal Linda Sami, DO  04/13/25 2335

## 2025-04-13 NOTE — H&P
[facility]  Office: [phone]  Gilmar Reynolds DO, Wilder Ulloa DO, Pasquale Dalal DO, Lars Levin DO, Eagle Barry MD, Milka Salazar MD, Armani Zapata MD, Sofya Granados MD,  Renato Salinas MD, Marlen Edwards MD, Max Dickens MD,  Avi Oswald DO, Eden Barclay MD, Jack Sargent MD, Jose Guadalupe Reynolds DO, Joan Pedraza MD,  Meliton Smiley DO, Brenda Parker MD, Tea Sequeira MD, Malcolm Ray MD,  Bonilla Acosta MD, Venancio Kapadia MD, Nereida Antoine MD, Constantine Guillen MD, Jere Pugh MD, Nohelia Morris MD, Ian Langston DO, Cait Mccormack MD, Anuj Ferrell MD, Avi Medeiros MD, Mohsin Reza, MD, Diann Grimm MD, Shirley Waterhouse, CNP,  Marlena Bennett, CNP, Ian Duran, CNP,  Raquel Barney, DNP, Tabitha Huerta, CNP, Alena Blanchard, CNP, Leatha Hylton, CNP, Lucille Mcneil, CNP, Erica Burroughs, PA-C, Ginny Vallejo, CNP, Anastacia English, CNP,  Olga Bradley, CNP, Malka Maxwell, CNP, Natalio Allen, PA-C, Angeline Castillo, CNP,  Urszula Ruiz, CNS, Delisa Shaikh, CNP, Marie Tate, CNP,   Deisy Lizarraga, CNP         [facility]   IN-PATIENT SERVICE   [facility]    HISTORY AND PHYSICAL EXAMINATION            Date:   4/13/2025  Patient name:  Jen Rdz  Date of admission:  4/13/2025  1:47 PM  MRN:   [id number]  Account:  [id number]  YOB: 1965  PCP:    Scott Vides MD  Room:   [facility] OSIEL/OSIEL  Code Status:    Prior    Chief Complaint:     Chief Complaint   Patient presents with    Knee Pain     Left knee. Pt denies injury/trauma. Pt states hx blood clots. On warfarin     History Obtained From:     patient, electronic medical record    History of Present Illness:     Jen Rdz is a 59 y.o. Non- / non  female who presents with Knee Pain (Left knee. Pt denies injury/trauma. Pt states hx blood clots. On warfarin)   and is admitted to the hospital for the management of Inability to ambulate due to knee.    This

## 2025-04-13 NOTE — ED NOTES
ED to inpatient nurses report     Admitting Diagnosis: unable to ambulate, chronic left knee pain  Chief Complaint   Patient presents with    Knee Pain     Left knee. Pt denies injury/trauma. Pt states hx blood clots. On warfarin      Present to ED from home via ems for left knee pain  LOC: alert and orientated to name, place, date  Patient confused: a/o x4  Vital signs   Vitals:    04/13/25 1348 04/13/25 1541 04/13/25 1545   BP: 125/72 122/71 120/76   Pulse: 98     Resp: 16     Temp: 97.9 °F (36.6 °C)     SpO2: 97% 91% 92%   Weight: 92.5 kg (204 lb)     Height: 1.6 m (5' 3\")        Oxygen Baseline RA    Current needs required none       LDAs:   Peripheral IV 04/13/25 Right Antecubital (Active)   Site Assessment Clean, dry & intact 04/13/25 1550   Line Status Blood return noted;Flushed;Specimen collected 04/13/25 1550   Phlebitis Assessment No symptoms 04/13/25 1550   Infiltration Assessment 0 04/13/25 1550   Dressing Status New dressing applied;Clean, dry & intact 04/13/25 1550   Dressing Type Transparent 04/13/25 1550     Mobility: Fully dependent  Fall Risk: [place] 1 Fall Risk  Presents to emergency department  because of falls (Syncope, seizure, or loss of consciousness): No (04/13/25 1349)  Age > 70: No (04/13/25 1349)  Altered Mental Status, Intoxication with alcohol or substance confusion (Disorientation, impaired judgment, poor safety awaremess, or inability to follow instructions): No (04/13/25 1349)  Impaired Mobility: Ambulates or transfers with assistive devices or assistance; Unable to ambulate or transer.: No (04/13/25 1349)  Nursing Judgement: Yes (04/13/25 1349)  Standard Fall Risk Interventions : [place] the patient to the environment, especially the location of the bathroom;Call light and frequently used items within patient reach;Bed in lowest position and wheels locked, as applicable to the type of bed, when a patient is resting in bed, raise bed to a comfortable height when the patient is

## 2025-04-13 NOTE — ED NOTES
7/27/2023       RE: Klarissa Curtis  [address]  [place] [id number]     Dear Colleague,    Thank you for referring your patient, Klarissa Curtis, to the [facility] [place] at [facility]. Please see a copy of my visit note below.    [facility] Dermatology Note  Encounter Date: Jun 22, 2023  Office Visit      Dermatology Problem List:  1. Hidradenitis suppurativa: diagnosed age 12, initially on waistline (used to weigh 300 lbs) which cleared up -> intermittent outbreaks -> for the past 10 years has had in bilateral axilla, groin, thighs, and buttocks.   - Humira, suboxone, prednisone, resorcinol cream  - Flare plan: ILK 60 injections, Prednisone 60 mg x 1 week, 50 mg x 1 week, 40 mg x 1 week (she does not like to taper below 40 mg as she flares)  - I&D x2 to lesions on the R axilla - 8/4/22  - Prior: Xeljanz, infliximab, prednisone taper, clobetasol, topical morphine, ILK, oral antibiotics, rifampin, sirolimus 2 mg (started 4/22/21) finasteride, spironolactone (6/2019- 9/5/19, stopped due to abnormal uterine bleeding), topical morphine gel, and percocet for severe pain, Cosentyx, belbuca  2. Keratosis pilaris  3. L olecranon bursitis.   4. Dermatographis  -  allegra 180mg  5. Eruption NOS,   - punch biopsy 8/5/2021: superficial and deep perivascular and interstitial infiltrate of neutrophils, with focal leukocytoclasis and bluish, amorphous material suggestive of neutrophil degranulation, and lesser amounts of eosinophils and lymphocytes. Background dermal edema is noted. Focal neutrophilic epitheliotropism is noted.  - Resolved  6. Pink linear patches, ddx: follicular dermatitis vs lichen spinulosus vs KP, posterior shoulders   - amlactin, lidex   7. Seborrheic dermatitis  - desonide 0.05% ointment, ketoconazole 2% shampoo      Soc Hx: She works as an / at the  Pt to ed c/o left knee pain. Pt states the pain feels how it did when she had a clot before. Pt states hx blood clots. Pt states she is on warfarin. Pt rates pain 10/10. Pt a/o x4. Respirations equal and non labored. Pt speaking in full and complete sentences. Pt able to scoot from ems stretcher to ed stretcher independently. Bed locked and in lowest position. Call light in reach. Daughter at bedside    air[place] (lots of walking/running around during COVID, now more sedentary at work)     ____________________________________________     Assessment & Plan:  # HS, Stage II, improved from last visit.   - Continue Humira 80 mg weekly  - Continue Percocet PRN pain, previously on suboxone but stopped as too expensive and didn't like how it made her feel   - Last prescribed percocet 28 pills on July 6. She has been taking it 2 pills 2-3 x a day but then it calmed down and then it started flaring again  - Continue calcium 1200mg and vitamin D 2000 international unit(s)  - Additional referral provided for plastic surgery today   - Referral to OBGYN provided last visit about hysterectomy  - Recommended Immunizations: Due for 4th COVID booster. Needs pneumonia and shingrix. Will need PCP appointment  - Did not meet criteria for free drug for upatacitinib. Ozempic also declined.  - gabapentin  - Plastic surgery pending  - Didn't receive resorcinol cream BID previously prescribed for flares, can discuss at future visits  - Future consideration: re-trying infliximab   - Pain:  - Percocet daily as needed  - Gabapentin 300mg at night for 3 days, if tolerating, increase to 300mg twice a day for 3 days. If tolerating, increase to 300mg 3x a day for 3 days. Continue until you are at 900mg (3 pills) three times a day. If developing side effects, like drowsiness, go back to last dose tolerated.  - Resorcinol cream twice a day as needed for flares  Chemistry Rx  Resorcinol 15% in [facility] [place]   30 g = $68  60g = $95   Phone #: [phone]      # Seborrheic dermatitis, ears and posterior neck  - Continue desonide 0.05% ointment BID until resolves   - Continue ketoconazole 2% shampoo 2-3 x weekly      # Dermatographism  - continue allegra to 360 mg BID        # Eruption of unclear specification on chest and face under ears: ddx follicular dermatitis vs lichen spinulosus vs KP vs lichen simplex chronicus over underlying dermatographism  -  Previous linear patches (ddx follicular dermatitis vs lichen spinulosus vs KP) resolved previously with lidex and amlactin. Unclear if same as previous eruption or new- - Ordered lidex ointment but insurance didn't cover, used clobetasol instead    # Leg swelling  - Compression stockings 20mmHg  - No difficulty breathing or sob laying down. Unlikely fluid retention.  - IN future, will check BNP      Follow-up: 8 week(s) in-person, or earlier for new or changing lesions     Staff and Scribe:      Camille Pal, MS3   [employer]     Staff Physician:  I was present with the medical student who participated in the service and in the documentation of the note. I have verified the history and personally performed the physical exam and medical decision making. I agree with the assessment and plan of care as documented in the note.      Jean Kenny MD    Department of Dermatology  [facility]: Phone: [phone], Fax: [phone]  6/25/2023           ____________________________________________     CC: Derm Problem (Maura is here today for HS flare.)     HPI:  Flared on the left side in [place] with cellulitis like plaque in left axilla. It is still present from 6/28/23.   [place] flared on the rt  Schedule for plastic surgery in Oct  Less flares of urticaria on higher dose allegra  Eruption NOS spread to ear and hair and clear on back. Clobetasol ointment is helping.      Medications:  Current Outpatient Medications   Medication    adalimumab (HUMIRA *CF* PEN-CD/UC/HS STARTER) 80 MG/0.8ML pen kit    ammonium lactate (AMLACTIN) 12 % external cream    calcium carbonate (CALCIUM CARBONATE) 600 MG tablet    calcium carbonate (OS-MERCY) 1500 (600 Ca) MG tablet    clindamycin (CLEOCIN T) 1 % external solution    clobetasol (TEMOVATE) 0.05 % external cream    desonide (DESOWEN) 0.05 % external ointment     DULoxetine (CYMBALTA) 60 MG capsule    fexofenadine (ALLEGRA) 180 MG tablet    fexofenadine (ALLEGRA) 180 MG tablet    fluocinolone acetonide (DERMA SMOOTHE/FS BODY) 0.01 % external oil    fluocinonide (LIDEX) 0.05 % external ointment    fluocinonide (LIDEX) 0.05 % external ointment    FLUoxetine (PROZAC) 40 MG capsule    ketoconazole (NIZORAL) 2 % external shampoo    ketoconazole (NIZORAL) 2 % external shampoo    medical cannabis (Patient's own supply)    oxyCODONE-acetaminophen (PERCOCET) 5-325 MG tablet    polyethylene glycol (MIRALAX) 17 GM/Dose powder    Resorcinol POWD    semaglutide (OZEMPIC) 2 MG/1.5ML SOPN pen    Semaglutide, 1 MG/DOSE, (OZEMPIC) 4 MG/3ML pen    Semaglutide, 2 MG/DOSE, (OZEMPIC) 8 MG/3ML pen    traZODone (DESYREL) 150 MG tablet    traZODone (DESYREL) 50 MG tablet    upadacitinib ER (RINVOQ) 15 MG tablet    VITAMIN D3 50 MCG (2000 UT) tablet     Current Facility-Administered Medications   Medication    triamcinolone acetonide (KENALOG-10) injection 40 mg    triamcinolone acetonide (KENALOG-10) injection 60 mg       Past Medical History:       Patient Active Problem List   Diagnosis    Hidradenitis suppurativa    Fatigue      Past Medical History

## 2025-04-13 NOTE — CONSULTS
Warfarin Dosing - Pharmacy Consult Note  Consulting Provider: CHRISTOPH Villasenor  Indication:  History of  VTE/PE  Warfarin Dose prior to admission: 2.5mg on Saturday; 5mg all other days (managed by [facility])   Concurrent anticoagulants/antiplatelets: ASA 81mg  Significant Drug Interactions:  Dexamethasone    Recent Labs     04/13/25  1548   INR 1.4   HGB 13.3        Recent warfarin administrations        No warfarin orders with administrations found.            Orders not given:            warfarin placeholder: dosing by pharmacy                   Date   INR    Dose  4/13       1.4    Assessment/Plan  (Goal INR: 2 - 3)  Patient took warfarin 5mg dose today before being admitted.   Review PT/INR in the morning and dose accordingly.    Active problem list reviewed.  INR orders are placed.  Chart reviewed for pertinent labs, drug/diet interactions, and past doses.  Documentation of patient's clinical condition was reviewed.    Pharmacy Dosing:  Pharmacy will continue to follow.

## 2025-04-13 NOTE — ED NOTES
Writer attempted to get patient to walk with a walker. Upon standing pt did not take a step and just said \"I can't\". Pt states she is not able to put any weight on her left leg d/t pain. Dr Calvert notified.

## 2025-04-14 LAB
INR PPP: 1.4
PROTHROMBIN TIME: 17.5 SEC (ref 11.5–14.2)

## 2025-04-14 PROCEDURE — 97161 PT EVAL LOW COMPLEX 20 MIN: CPT

## 2025-04-14 PROCEDURE — 97530 THERAPEUTIC ACTIVITIES: CPT

## 2025-04-14 PROCEDURE — 99232 SBSQ HOSP IP/OBS MODERATE 35: CPT

## 2025-04-14 PROCEDURE — 97166 OT EVAL MOD COMPLEX 45 MIN: CPT

## 2025-04-14 PROCEDURE — 6360000002 HC RX W HCPCS

## 2025-04-14 PROCEDURE — 1200000000 HC SEMI PRIVATE

## 2025-04-14 PROCEDURE — 2500000003 HC RX 250 WO HCPCS

## 2025-04-14 PROCEDURE — 36415 COLL VENOUS BLD VENIPUNCTURE: CPT

## 2025-04-14 PROCEDURE — 85610 PROTHROMBIN TIME: CPT

## 2025-04-14 PROCEDURE — 97535 SELF CARE MNGMENT TRAINING: CPT

## 2025-04-14 PROCEDURE — 6370000000 HC RX 637 (ALT 250 FOR IP)

## 2025-04-14 RX ORDER — WARFARIN SODIUM 10 MG/1
10 TABLET ORAL
Status: COMPLETED | OUTPATIENT
Start: 2025-04-14 | End: 2025-04-14

## 2025-04-14 RX ADMIN — SODIUM CHLORIDE, PRESERVATIVE FREE 10 ML: 5 INJECTION INTRAVENOUS at 19:47

## 2025-04-14 RX ADMIN — DEXAMETHASONE SODIUM PHOSPHATE 4 MG: 4 INJECTION INTRA-ARTICULAR; INTRALESIONAL; INTRAMUSCULAR; INTRAVENOUS; SOFT TISSUE at 18:21

## 2025-04-14 RX ADMIN — FERROUS SULFATE TAB EC 325 MG (65 MG FE EQUIVALENT) 325 MG: 325 (65 FE) TABLET DELAYED RESPONSE at 08:55

## 2025-04-14 RX ADMIN — DEXAMETHASONE SODIUM PHOSPHATE 4 MG: 4 INJECTION INTRA-ARTICULAR; INTRALESIONAL; INTRAMUSCULAR; INTRAVENOUS; SOFT TISSUE at 00:50

## 2025-04-14 RX ADMIN — SODIUM CHLORIDE, PRESERVATIVE FREE 10 ML: 5 INJECTION INTRAVENOUS at 12:52

## 2025-04-14 RX ADMIN — PRAVASTATIN SODIUM 10 MG: 20 TABLET ORAL at 08:56

## 2025-04-14 RX ADMIN — COLCHICINE 0.6 MG: 0.6 TABLET ORAL at 08:56

## 2025-04-14 RX ADMIN — DEXAMETHASONE SODIUM PHOSPHATE 4 MG: 4 INJECTION INTRA-ARTICULAR; INTRALESIONAL; INTRAMUSCULAR; INTRAVENOUS; SOFT TISSUE at 12:51

## 2025-04-14 RX ADMIN — SODIUM CHLORIDE, PRESERVATIVE FREE 10 ML: 5 INJECTION INTRAVENOUS at 09:02

## 2025-04-14 RX ADMIN — SODIUM CHLORIDE, PRESERVATIVE FREE 10 ML: 5 INJECTION INTRAVENOUS at 18:21

## 2025-04-14 RX ADMIN — DEXAMETHASONE SODIUM PHOSPHATE 4 MG: 4 INJECTION INTRA-ARTICULAR; INTRALESIONAL; INTRAMUSCULAR; INTRAVENOUS; SOFT TISSUE at 06:01

## 2025-04-14 RX ADMIN — SACUBITRIL AND VALSARTAN 1 TABLET: 49; 51 TABLET, FILM COATED ORAL at 19:47

## 2025-04-14 RX ADMIN — OXYCODONE HYDROCHLORIDE 10 MG: 5 TABLET ORAL at 04:57

## 2025-04-14 RX ADMIN — WARFARIN SODIUM 10 MG: 10 TABLET ORAL at 18:21

## 2025-04-14 RX ADMIN — SODIUM CHLORIDE, PRESERVATIVE FREE 10 ML: 5 INJECTION INTRAVENOUS at 16:29

## 2025-04-14 RX ADMIN — ASPIRIN 81 MG: 81 TABLET, CHEWABLE ORAL at 08:56

## 2025-04-14 ASSESSMENT — PAIN SCALES - GENERAL
PAINLEVEL_OUTOF10: 7
PAINLEVEL_OUTOF10: 8
PAINLEVEL_OUTOF10: 2

## 2025-04-14 ASSESSMENT — ENCOUNTER SYMPTOMS
DIARRHEA: 0
NAUSEA: 0
COUGH: 0
ABDOMINAL PAIN: 0
VOMITING: 0
SHORTNESS OF BREATH: 0
CONSTIPATION: 1

## 2025-04-14 ASSESSMENT — PAIN - FUNCTIONAL ASSESSMENT
PAIN_FUNCTIONAL_ASSESSMENT: PREVENTS OR INTERFERES SOME ACTIVE ACTIVITIES AND ADLS
PAIN_FUNCTIONAL_ASSESSMENT: PREVENTS OR INTERFERES SOME ACTIVE ACTIVITIES AND ADLS

## 2025-04-14 ASSESSMENT — PAIN DESCRIPTION - ORIENTATION
ORIENTATION: LEFT
ORIENTATION: LEFT

## 2025-04-14 ASSESSMENT — PAIN DESCRIPTION - ONSET: ONSET: ON-GOING

## 2025-04-14 ASSESSMENT — PAIN DESCRIPTION - PAIN TYPE: TYPE: ACUTE PAIN;CHRONIC PAIN

## 2025-04-14 ASSESSMENT — PAIN DESCRIPTION - DESCRIPTORS
DESCRIPTORS: SHARP
DESCRIPTORS: SHARP

## 2025-04-14 ASSESSMENT — PAIN DESCRIPTION - LOCATION
LOCATION: KNEE
LOCATION: KNEE

## 2025-04-14 ASSESSMENT — PAIN DESCRIPTION - FREQUENCY: FREQUENCY: INTERMITTENT

## 2025-04-14 NOTE — PLAN OF CARE
Problem: Chronic Conditions and Co-morbidities  Goal: Patient's chronic conditions and co-morbidity symptoms are monitored and maintained or improved  Outcome: Progressing  Flowsheets (Taken 4/14/2025 0902)  Care Plan - Patient's Chronic Conditions and Co-Morbidity Symptoms are Monitored and Maintained or Improved:   Monitor and assess patient's chronic conditions and comorbid symptoms for stability, deterioration, or improvement   Collaborate with multidisciplinary team to address chronic and comorbid conditions and prevent exacerbation or deterioration   Update acute care plan with appropriate goals if chronic or comorbid symptoms are exacerbated and prevent overall improvement and discharge     Problem: Discharge Planning  Goal: Discharge to home or other facility with appropriate resources  Outcome: Progressing  Flowsheets (Taken 4/14/2025 0902)  Discharge to home or other facility with appropriate resources:   Identify barriers to discharge with patient and caregiver   Arrange for needed discharge resources and transportation as appropriate   Identify discharge learning needs (meds, wound care, etc)   Refer to discharge planning if patient needs post-hospital services based on physician order or complex needs related to functional status, cognitive ability or social support system     Problem: Skin/Tissue Integrity  Goal: Skin integrity remains intact  Description: 1.  Monitor for areas of redness and/or skin breakdown2.  Assess vascular access sites hourly3.  Every 4-6 hours minimum:  Change oxygen saturation probe site4.  Every 4-6 hours:  If on nasal continuous positive airway pressure, respiratory therapy assess nares and determine need for appliance change or resting period  Outcome: Progressing  Flowsheets (Taken 4/14/2025 1530)  Skin Integrity Remains Intact:   Monitor for areas of redness and/or skin breakdown   Check visual cues for pain     Problem: ABCDS Injury Assessment  Goal: Absence of physical

## 2025-04-14 NOTE — CARE COORDINATION
Case Management Assessment  Initial Evaluation    Date/Time of Evaluation: 4/14/2025 4:11 PM  Assessment Completed by: ISAIAH NEWMAN RN    If patient is discharged prior to next notation, then this note serves as note for discharge by case management.    Patient Name: Jen Rdz                   YOB: 1965  Diagnosis: Unable to ambulate [R26.2]  Inability to ambulate due to knee [R26.2]  Chronic pain of left knee [M25.562, G89.29]                   Date / Time: 4/13/2025  1:47 PM    Patient Admission Status: Inpatient   Readmission Risk (Low < 19, Mod (19-27), High > 27): Readmission Risk Score: 9.1    Current PCP: Cora Arechiga APRN - CNP  PCP verified by CM? Yes (at [facility])    Chart Reviewed: Yes      History Provided by: Patient  Patient Orientation: Alert and Oriented    Patient Cognition: Alert    Hospitalization in the last 30 days (Readmission):  No    If yes, Readmission Assessment in CM Navigator will be completed.    Advance Directives:      Code Status: Full Code   Patient's Primary Decision Maker is: Legal Next of Kin      Discharge Planning:    Patient lives with: Alone Type of Home: Apartment (. 3-4 steps to enter)  Primary Care Giver: Self  Patient Support Systems include: Children, Family Members   Current Financial resources: Medicare  Current community resources: ECF/Home Care  Current services prior to admission: None            Current DME:              Type of Home Care services:  None    ADLS  Prior functional level: Independent in ADLs/IADLs  Current functional level: Assistance with the following:, Housework, Cooking, Shopping    PT AM-PAC:   /24  OT AM-PAC: 19 /24    Family can provide assistance at DC: Yes  Would you like Case Management to discuss the discharge plan with any other family members/significant others, and if so, who? No  Plans to Return to Present Housing: Yes  Other Identified Issues/Barriers to RETURNING to current

## 2025-04-14 NOTE — PLAN OF CARE
Problem: Safety - Adult  Goal: Free from fall injury  Flowsheets (Taken 4/13/2025 2100)  Free From Fall Injury:   Instruct family/caregiver on patient safety   Based on caregiver fall risk screen, instruct family/caregiver to ask for assistance with transferring infant if caregiver noted to have fall risk factors     Problem: Pain  Goal: Verbalizes/displays adequate comfort level or baseline comfort level  Flowsheets (Taken 4/13/2025 2100)  Verbalizes/displays adequate comfort level or baseline comfort level:   Encourage patient to monitor pain and request assistance   Assess pain using appropriate pain scale   Implement non-pharmacological measures as appropriate and evaluate response   Notify Licensed Independent Practitioner if interventions unsuccessful or patient reports new pain   Consider cultural and social influences on pain and pain management   Administer analgesics based on type and severity of pain and evaluate response   Patient does not get up due to inability to bear weight on her L knee. Knee continues to c/o pain in knee that is relieved by oxycodone as ordered. Uses bedpan and purewick to void. No swelling or discoloration noted to affected area. Xray negative for acute process. Patient is alert &oriented x4. Uses call light appropriately. Bed alarm on. SR up x2. Call light in reach. Will continue to monitor.  Patient having pain on their knee and rates it a 7. Pain interventions includemedication. Patients goal for pain relief is 2. The need for pain and symptom management will be considered in the discharge planning process to ensure patients comfort.

## 2025-04-15 VITALS
HEIGHT: 63 IN | BODY MASS INDEX: 36.14 KG/M2 | RESPIRATION RATE: 18 BRPM | WEIGHT: 204 LBS | OXYGEN SATURATION: 96 % | HEART RATE: 63 BPM | SYSTOLIC BLOOD PRESSURE: 114 MMHG | DIASTOLIC BLOOD PRESSURE: 83 MMHG | TEMPERATURE: 97.3 F

## 2025-04-15 LAB
INR PPP: 1.7
PROTHROMBIN TIME: 20.4 SEC (ref 11.5–14.2)

## 2025-04-15 PROCEDURE — 97116 GAIT TRAINING THERAPY: CPT

## 2025-04-15 PROCEDURE — 36415 COLL VENOUS BLD VENIPUNCTURE: CPT

## 2025-04-15 PROCEDURE — 85610 PROTHROMBIN TIME: CPT

## 2025-04-15 PROCEDURE — 6360000002 HC RX W HCPCS: Performed by: NURSE PRACTITIONER

## 2025-04-15 PROCEDURE — 99239 HOSP IP/OBS DSCHRG MGMT >30: CPT | Performed by: NURSE PRACTITIONER

## 2025-04-15 PROCEDURE — 97110 THERAPEUTIC EXERCISES: CPT

## 2025-04-15 PROCEDURE — 6370000000 HC RX 637 (ALT 250 FOR IP)

## 2025-04-15 RX ORDER — PREDNISONE 20 MG/1
20 TABLET ORAL 2 TIMES DAILY
Qty: 10 TABLET | Refills: 0 | Status: SHIPPED | OUTPATIENT
Start: 2025-04-15 | End: 2025-04-20

## 2025-04-15 RX ORDER — WARFARIN SODIUM 5 MG/1
5 TABLET ORAL
Status: DISCONTINUED | OUTPATIENT
Start: 2025-04-15 | End: 2025-04-15 | Stop reason: HOSPADM

## 2025-04-15 RX ORDER — DEXAMETHASONE 4 MG/1
4 TABLET ORAL EVERY 6 HOURS SCHEDULED
Status: DISCONTINUED | OUTPATIENT
Start: 2025-04-15 | End: 2025-04-15 | Stop reason: HOSPADM

## 2025-04-15 RX ORDER — COLCHICINE 0.6 MG/1
0.6 TABLET ORAL DAILY
Qty: 30 TABLET | Refills: 3 | Status: SHIPPED | OUTPATIENT
Start: 2025-04-16

## 2025-04-15 RX ADMIN — DEXAMETHASONE 4 MG: 4 TABLET ORAL at 01:45

## 2025-04-15 RX ADMIN — FUROSEMIDE 40 MG: 40 TABLET ORAL at 08:03

## 2025-04-15 RX ADMIN — DEXAMETHASONE 4 MG: 4 TABLET ORAL at 08:09

## 2025-04-15 RX ADMIN — FERROUS SULFATE TAB EC 325 MG (65 MG FE EQUIVALENT) 325 MG: 325 (65 FE) TABLET DELAYED RESPONSE at 08:02

## 2025-04-15 RX ADMIN — ASPIRIN 81 MG: 81 TABLET, CHEWABLE ORAL at 08:03

## 2025-04-15 RX ADMIN — SPIRONOLACTONE 25 MG: 25 TABLET ORAL at 08:03

## 2025-04-15 RX ADMIN — COLCHICINE 0.6 MG: 0.6 TABLET ORAL at 08:02

## 2025-04-15 RX ADMIN — SACUBITRIL AND VALSARTAN 1 TABLET: 49; 51 TABLET, FILM COATED ORAL at 08:02

## 2025-04-15 RX ADMIN — PRAVASTATIN SODIUM 10 MG: 20 TABLET ORAL at 08:02

## 2025-04-15 ASSESSMENT — PAIN SCALES - GENERAL: PAINLEVEL_OUTOF10: 0

## 2025-04-15 NOTE — DISCHARGE SUMMARY
[facility]  Office: [phone]  Gilmar Reynolds DO, Wilder Ulloa DO, Pasquale Dalal DO, Lars Levin DO, Eagle Barry MD, Milka Salazar MD, Armani Zapata MD, Sofya Granados MD,  Renato Salinas MD, Marlen Edwards MD, Max Dickens MD,  Avi Oswald DO, Eden Barclay MD, Jack Sargent MD, Jose Guadalupe Reynolds DO, Joan Pedraza MD,  Meliton Smiley DO, Brenda Parker MD, Tea Sequeira MD, Malcolm Ray MD,  Bonilla Acosta MD, Venancio Kapadia MD, Nereida Antoine MD, Constantine Guillen MD, Jere Pugh MD, Noehlia Morris MD, Ian Langston DO, Cait Mccormack MD, Anuj Ferrell MD, Avi Medeiros MD, Mohsin Reza, MD, Diann Grimm MD, Shirley Waterhouse, CNP,  Marlena Bennett, CNP, Ian Duran, CNP,  Raquel Barney, VIVIANE, Tabitha Huerta, CNP, Alena Blanchard, CNP, Leatha Hylton, CNP, Lucille Mcneil, CNP, Erica Burroughs, PA-C, Ginny Vallejo, CNP, Anastacia English, CNP,  Olga Bradley, CNP, Malka Maxwell, CNP, Natalio Allen, PA-C, Angeline Castillo, CNP,  Urszula Ruiz, CNS, Delisa Shaikh, CNP, Marie Tate, CNP,   Deisy Lizarraga, CNP         [facility]   IN-PATIENT SERVICE   [facility]    Discharge Summary     Patient ID: Jen Rdz  :  1965   MRN: [id number]     ACCOUNT:  [id number]   Patient's PCP: Cora Arechiga APRN - CNP  Admit Date: 2025   Discharge Date: 4/15/2025     Length of Stay: 2  Code Status:  Full Code  Admitting Physician: Pasquale Dalal DO  Discharge Physician: VIVIEN Monk NP     Active Discharge Diagnoses:     Hospital Problem Lists:  Principal Problem:    Inability to ambulate due to knee  Active Problems:    Primary hypertension    CHF (congestive heart failure) ([facility])    AICD (automatic cardioverter/defibrillator) present    NICM (nonischemic cardiomyopathy) ([facility])    History of DVT (deep vein thrombosis)  Resolved Problems:    * No resolved hospital problems. *      Admission Condition:  fair     Discharged

## 2025-04-15 NOTE — PLAN OF CARE
Problem: Chronic Conditions and Co-morbidities  Goal: Patient's chronic conditions and co-morbidity symptoms are monitored and maintained or improved  4/15/2025 0426 by Mercedes Elizabeth RN  Outcome: Progressing  Flowsheets (Taken 4/14/2025 0902 by Luli Ceballos, RN)  Care Plan - Patient's Chronic Conditions and Co-Morbidity Symptoms are Monitored and Maintained or Improved:   Monitor and assess patient's chronic conditions and comorbid symptoms for stability, deterioration, or improvement   Collaborate with multidisciplinary team to address chronic and comorbid conditions and prevent exacerbation or deterioration   Update acute care plan with appropriate goals if chronic or comorbid symptoms are exacerbated and prevent overall improvement and discharge     Problem: Discharge Planning  Goal: Discharge to home or other facility with appropriate resources  4/15/2025 0426 by Mercedes Elizabeth RN  Outcome: Progressing  Flowsheets (Taken 4/14/2025 0902 by Luli Ceballos, RN)  Discharge to home or other facility with appropriate resources:   Identify barriers to discharge with patient and caregiver   Arrange for needed discharge resources and transportation as appropriate   Identify discharge learning needs (meds, wound care, etc)   Refer to discharge planning if patient needs post-hospital services based on physician order or complex needs related to functional status, cognitive ability or social support system     Problem: Skin/Tissue Integrity  Goal: Skin integrity remains intact  Description: 1.  Monitor for areas of redness and/or skin breakdown2.  Assess vascular access sites hourly3.  Every 4-6 hours minimum:  Change oxygen saturation probe site4.  Every 4-6 hours:  If on nasal continuous positive airway pressure, respiratory therapy assess nares and determine need for appliance change or resting period  4/15/2025 0426 by Mercedes Elizabeth, RN  Outcome: Progressing  Flowsheets (Taken 4/14/2025 1954 by Mateo

## 2025-04-15 NOTE — PROGRESS NOTES
CLINICAL PHARMACY NOTE: MEDS TO BEDS    Total # of Prescriptions Filled: 2   The following medications were delivered to the patient:  Prednisone 20mg  Colchicine 0.6mg    Additional Documentation:    
Comprehensive Nutrition Assessment    Type and Reason for Visit:  Positive nutrition screen (Do you have cultural, Caodaism, or ethnic food preferences)    Nutrition Recommendations/Plan:   Add Low Sodium diet restriction  Provided Low Purine Restricted Nutrition Therapy for gout  Monitor p.o intakes and labs     Malnutrition Assessment:  Malnutrition Status:  At risk for malnutrition (04/14/25 4936)        Nutrition Assessment:    Patient admission is related to inability to ambulate due to left knee pain. Positive nutrition screen received for cultural, Caodaism, or ethnic food preference. Patient reports she does not eat much meat but sometimes will eat turkey or beef. Patient wants to stay on a Regular diet but did state she follows a low sodium diet. Patient expressed surprise that her leg pain is related to gout. Uric acid levels are at 10.8 (H). Patient accepted handout on Low Purine diet. Will add Low Sodium restriction to diet order. Monitor p.o intakes and labs.    Nutrition Related Findings:    No edema. Active bowel sounds. Uric acid: 10.8 (H) Wound Type: None       Current Nutrition Intake & Therapies:          ADULT DIET; Regular    Anthropometric Measures:  Height: 160 cm (5' 3\")  Ideal Body Weight (IBW): 115 lbs (52 kg)       Current Body Weight: 92.5 kg (203 lb 14.8 oz), 177.3 % IBW.    Current BMI (kg/m2): 36.1                             BMI Categories: Obese Class 2 (BMI 35.0 -39.9)      Nutrition Diagnosis:   Food & nutrition-related knowledge deficit related to other (lack of pror exposure of accurate nutrition-related information) as evidenced by other (verbalizes inaccurate or incomplete information)    Nutrition Interventions:   Food and/or Nutrient Delivery: Modify Current Diet  Nutrition Education/Counseling: Education/Counseling completed  Coordination of Nutrition Care: Continue to monitor while inpatient       Goals:  Goals: PO intake 75% or greater          Nutrition Monitoring and 
Patient admitted to room 1003 from ER  VS taken, telemetry placed and call light given/within reach.   Patient A&O x4 and given room orientation.   Orders released/reviewed  Initial assessment completed and admission database completed/started  Bed locked and lowest position  Bed alarm on for safety and non skid footwear placed on pt    Care ongoing  
Patient iv fell out in shower, messaged mindy shah to ask if she needs a new line as I received in report plan is to discharge patient in am.   
Patient received in room 2104, arrived via wheelchair, patient sitting at side of bed, A&O x4, patient rates left knee pain at 4/10 right now and denies any need for medication at this time, RN wrapping patients iv as patient has requested to take shower. Linens, gown and toiletries provided. Patient educated on safety in bathroom as well as pull cord for any needs that will alert staff.   
Physical Therapy  Facility/Department: [facility] Physical Therapy Treatment Note    NAME: Jen Rdz  : 1965 (59 y.o.)  MRN: [id number]  CODE STATUS: Prior    Date of Service: 4/15/25       Restrictions:  Restrictions/Precautions: General Precautions  Position Activity Restriction  Other Position/Activity Restrictions: RUE IV, alarms     SUBJECTIVE  Subjective: pt in bedside chair agreeable to PT       OBJECTIVE  Cognition  Arousal/Alertness: Appears intact  Following Commands: Follows multistep commands with repitition  Attention Span: Appears intact  Memory: Decreased short term memory  Safety Judgement: Decreased awareness of need for safety  Problem Solving: Assistance required to correct errors made;Decreased awareness of errors;Assistance required to identify errors made  Insights: Decreased awareness of deficits  Initiation: Requires cues for some  Sequencing: Requires cues for some  Orientation  Overall Orientation Status: Within Normal Limits  Orientation Level: Oriented X4    Functional Mobility  Transfers  Surface: From chair with arms;To chair with arms  Additional Factors: Verbal cues  Sit to Stand  Assistance Level: Contact guard assist;Stand by assist  Stand to Sit  Assistance Level: Stand by assist  Bed To/From Chair  Technique: Stand step;Stand pivot  Assistance Level: Stand by assist;Contact guard assist  Stand Pivot  Assistance Level: Stand by assist;Contact guard assist      Environmental Mobility  Ambulation  Surface: Level surface  Device: Rolling walker  Distance: 80 ft  Activity: Within Room;Within Unit  Additional Factors: Verbal cues  Assistance Level: Contact guard assist;Stand by assist  Gait Deviations: Decreased step length bilateral;Slow chriss             PT Exercises  Exercise Treatment: seated ex AROM x 10, issued pt HEP for seated/standing HEP reviewed all ex with pt.      ASSESSMENT/PROGRESS TOWARDS GOALS  Vital Signs  BP Location: Left upper 
Physical Therapy  Facility/Department: [facility] PROGRESSIVE CARE   Physical Therapy Initial Evaluation    Patient Name: Jen Rdz        MRN: [id number]    : 1965    Date of Service: 2025    HPI (per chart): This 59-year-old female was admitted for management of inability to ambulate secondary to the left knee pain. She had been complaining of worsening left knee pain for approximately 2 days and developed difficulty ambulating. She does live at home independently and does not use any assistive devices while walking. Found to have elevated uric acid, imaging unremarkable. She was evaluated by orthopedic surgery who feels this is a gout flare. She was started on steroids and colchicine.     Past Medical History:  has a past medical history of CHF (congestive heart failure) ([facility]), History of DVT (deep vein thrombosis), Hypertension, NICM (nonischemic cardiomyopathy) ([facility]), and Noncompliance.  Past Surgical History:  has a past surgical history that includes Cardiac catheterization (2008) and Cardiac defibrillator placement.    Discharge Recommendations  Discharge Recommendations: Home independently       Assessment  Body Structures, Functions, Activity Limitations Requiring Skilled Therapeutic Intervention: Decreased functional mobility , Increased pain  Assessment: Patient reports pain is improving and better able to tolerate ambulation. Pateint demo good safety ambulating in room with RW. Will contnue to see patient to assess pain and ambulation over longer distances and for stair negotiation.  Therapy Prognosis: Good  Decision Making: Low Complexity  Requires PT Follow-Up: Yes  Activity Tolerance  Activity Tolerance: Patient tolerated treatment well  Safety Devices  Type of Devices: Gait belt, Nurse notified, Left in bed    AM-PAC  AM-PAC Basic Mobility - Inpatient   How much help is needed turning from your back to your side while in a flat bed without using bedrails?: None  How much help is 
Pt discharged to home in stable condition with belongings  Discharge instructions given  Medication at bedside  Pt denies having any further questions at this time  Locked up home medication(s)/personal items given to patient at discharge  Patient state they have everything they were admitted with.    
Report given to Med Surg RN, all questions and concerns addressed  
Spiritual Health History and Assessment/Progress Note  [facility]    (P) Loneliness/Social Isolation,  ,  ,      Name: Jen Rdz MRN: [id number]    Age: 59 y.o.     Sex: female   Language: English   Adventist: Apostolic   Inability to ambulate due to knee     Date: 4/14/2025            Total Time Calculated: (P) 20 min              Spiritual Assessment began in [facility]        Referral/Consult From: (P) Nurse   Encounter Overview/Reason: (P) Loneliness/Social Isolation  Service Provided For: (P) Patient    Marry, Belief, Meaning:   Patient identifies as spiritual, is connected with a marry tradition or spiritual practice, and has beliefs or practices that help with coping during difficult times  Family/Friends No family/friends present      Importance and Influence:  Patient has spiritual/personal beliefs that influence decisions regarding their health  Family/Friends No family/friends present    Community:  Patient is connected with a spiritual community and feels well-supported. Support system includes: Marry Community and Extended family    Assessment and Plan of Care:     Patient Interventions include: Engaged in theological reflection, Affirmed coping skills/support systems, and Provided sacramental/Mu-ism ritual    Patient Plan of Care: Spiritual Care available upon further referral    Electronically signed by Chaplain Burgos Jr on 4/14/2025 at 10:35 AM     04/14/25 1032   Encounter Summary   Encounter Overview/Reason Loneliness/Social Isolation   Service Provided For Patient   Referral/Consult From Nurse   Support System Family members   Last Encounter  04/14/25   Complexity of Encounter Moderate   Begin Time 1000   End Time  1020   Total Time Calculated 20 min   Spiritual/Emotional needs   Type Spiritual Support   Assessment/Intervention/Outcome   Assessment Calm;Coping;Hopeful;Peaceful   Intervention Discussed belief system/Mu-ism practices/marry;Discussed 
[facility]  Office: [phone]  Gilmar Reynolds DO, Wilder Ulloa DO, Pasquale Dalal DO, Lars Levin DO, Eagle Barry MD, Milka Salazar MD, Armani Zapata MD, Sofya Granados MD,  Renato Salinas MD, Marlen Edwards MD, Max Dickens MD,  Avi Oswald DO, Eden Barclay MD, Jack Sargent MD, Jose Guadalupe Reynolds DO, Joan Pedraza MD,  Meliton Smiley DO, Brenda Parker MD, Tea Sequeira MD, Malcolm Ray MD,  Bonilla Acosta MD, Venancio Kapadia MD, Nereida Antoine MD, Constantine Guillen MD, Jere Pugh MD, Nohelia Morris MD, Ian Langston DO, Cait Mccormack MD, Anuj Ferrell MD, Avi Medeiros MD, Mohsin Reza, MD, Diann Grimm MD, Shirley Waterhouse, CNP,  Marlena Bennett, CNP, Ian Duran, CNP,  Raquel Barney, DNP, Tabitha Huerta, CNP, Alena Blanchard, CNP, Leatha Hylton, CNP, Lucille Mcneil, CNP, Erica Burroughs, PA-C, Ginny Vallejo, CNP, Anastacia English, CNP,  Olga Bradley, CNP, Malka Maxwell, CNP, Natalio Allen, PA-C, Angeline Castillo, CNP,  Urszula Ruiz, CNS, Delisa Shaikh, CNP, Marie Tate, CNP,   Deisy Lizarraga, CNP         [facility]   IN-PATIENT SERVICE   [facility]    Progress Note    4/14/2025    7:33 AM    Name:   Jen Rdz  MRN:     [id number]     Acct:      [id number]   Room:   1003/1003-02  IP Day:  1  Admit Date:  4/13/2025  1:47 PM    PCP:   Scott Vides MD  Code Status:  Full Code    Subjective:     C/C:   Chief Complaint   Patient presents with    Knee Pain     Left knee. Pt denies injury/trauma. Pt states hx blood clots. On warfarin     Interval History Status: improved.     Patient seen and evaluated.  States that her pain has improved.  She has not yet worked with therapy.  Discussed probability of gout.  Patient appreciative of this information.  No fevers, chills, chest pain, shortness of breath.  No other reported concerns.    Brief History:     This 59-year-old female was admitted for management of inability to ambulate 
Warfarin Dosing - Pharmacy Consult Note  Consulting Provider: CHRISTOPH Villasenor  Indication:  History of  VTE/PE  Warfarin Dose prior to admission: 2.5mg on Saturday; 5mg all other days (managed by [facility])    Concurrent anticoagulants/antiplatelets: ASA 81mg  Significant Drug Interactions:  dexamethasone (started 4/13/25)    Recent Labs     04/13/25  1548 04/14/25  0445   INR 1.4 1.4   HGB 13.3  --      --      Recent warfarin administrations        No warfarin orders with administrations found.            Orders not given:            warfarin placeholder: dosing by pharmacy                   Date   INR    Dose  4/13      1.4         5mg (at home)  4/14      1.4    Assessment/Plan  (Goal INR: 2 - 3)  INR sub-therapeutic. Will give boosted dose today.  Warfarin 10mg today. INR in the morning.    Active problem list reviewed.  INR orders are placed.  Chart reviewed for pertinent labs, drug/diet interactions, and past doses.  Documentation of patient's clinical condition was reviewed.    Pharmacy Dosing:  Pharmacy will continue to follow.       
Warfarin Dosing - Pharmacy Consult Note  Consulting Provider: CHRISTOPH Villasenor  Indication:  History of  VTE/PE  Warfarin Dose prior to admission: 2.5mg on Saturday; 5mg all other days (managed by [facility])    Concurrent anticoagulants/antiplatelets: ASA 81mg  Significant Drug Interactions: dexamethasone (started 4/13/25)     Recent Labs     04/13/25  1548 04/14/25  0445 04/15/25  0637   INR 1.4 1.4 1.7   HGB 13.3  --   --      --   --      Recent warfarin administrations                     warfarin (COUMADIN) tablet 10 mg (mg) 10 mg Given 04/14/25 1821                   Date   INR    Dose  4/13      1.4        5mg (at home)  4/14      1.4       10mg  4/15      1.7      Assessment/Plan  (Goal INR: 2 - 3)  INR sub-therapeutic but rising. Will resume home dose - expecting INR to continue to rise from 10mg dose yesterday.    Warfarin 5mg today. INR in the morning.    Active problem list reviewed.  INR orders are placed.  Chart reviewed for pertinent labs, drug/diet interactions, and past doses.  Documentation of patient's clinical condition was reviewed.    Pharmacy Dosing:  Pharmacy will continue to follow.       
[x] The Home Med List was verified for accuracy and marked COMPLETED. The following sources were used to assist with Medication Reconciliation:    [] Med Rec Pharmacy tech has already completed home med list in the ED and note reviewed   [] Patient med list was transcribed into the EHR and verified for accuracy.(Note method of verification)  [] Patient provided bottles of their medications for validation  [x] Medications reviewed and confirmed with patient and Tiara pharmacist at [facility] (Please list name and relationship to patient)  [] Contacted patient's pharmacy to confirm home medications (Please list the pharmacy)  [] Home medications reviewed using Dispense Report   [] Contacted physician office to confirm home medications  [] Medical Records received from another facility (list facility and place copy placed in chart)  [x] CHRISTOPH Maldonado was notified regarding changes to home med list via [employer] on 4/13/2025 at the following time:        [] There are one or more home medications that need clarification before Medication Reconciliation can be marked completed. The Med List Status has been marked as In Progress.   To assist with Home Medication Reconciliation the following actions have been taken:    [] Family requested to bring medications in their original bottles to the hospital for verification  [] Family requested to call hospital with list of medications  [] Call to physicians off and left message (list physician and who they spoke to, date and time)  [] Request for medical records made to   [] MAR from facility requested to be faxed over  [] Unable to complete due to patient condition  [] Oncoming nurse  notified of incomplete med list for follow up  [] Other       *Effective communication is essential throughout this process. It is important for the nurse to document the progress of the med rec to keep team members informed. If any changes are made to the home medication list, the nurse is 
Dressing: Setup; Contact guard   LE Dressing Skilled Clinical Factors: Pt was able to sit EOB and crossed BLE's and donned B socks following set up/SBA and cues for pacing and pursed lip breathing  Toileting Skilled Clinical Factors: with gown mgt  Functional Mobility: Contact guard assistance;Stand by assistance (bed to door and back to recliner with use of RW)  Functional Mobility Skilled Clinical Factors: MOD cues/tactile assist and demo for upright postur/weight shifting, pacing, scanning, extending BUE's on AD for increased support, AD/LLE and RLE sequence all to increase safety/reduce falls                 Cognition  Overall Cognitive Status: Exceptions  Arousal/Alertness: Appears intact  Following Commands: Follows multistep commands with repitition  Attention Span: Appears intact  Memory: Decreased short term memory  Safety Judgement: Decreased awareness of need for safety  Problem Solving: Assistance required to correct errors made;Decreased awareness of errors;Assistance required to identify errors made  Insights: Decreased awareness of deficits  Initiation: Requires cues for some  Sequencing: Requires cues for some  Orientation  Overall Orientation Status: Within Functional Limits  Orientation Level: Oriented X4  Perception  Overall Perceptual Status: WFL               Education Given To: Patient  Education Provided: Role of Therapy;Plan of Care;Transfer Training;Energy Conservation;Fall Prevention Strategies  Education Provided Comments: pressure relief, edema mgt tech, safety in function, call light use, benefits being up OOB/recommendations for continued therapy, pursed lip breathing, postural control and weight shifting  Education Method: Demonstration;Verbal  Barriers to Learning: Cognition  Education Outcome: Verbalized understanding;Continued education needed                                                                   AM-PAC - ADL   19           Goals  Short Term Goals  Time Frame for Short

## 2025-04-15 NOTE — PLAN OF CARE
Problem: Chronic Conditions and Co-morbidities  Goal: Patient's chronic conditions and co-morbidity symptoms are monitored and maintained or improved  4/15/2025 1154 by Radhika Skinner RN  Outcome: Adequate for Discharge         Problem: Discharge Planning  Goal: Discharge to home or other facility with appropriate resources  4/15/2025 1154 by Radhika Skinner RN  Outcome: Adequate for Discharge         Problem: Skin/Tissue Integrity  Goal: Skin integrity remains intact  Description: 1.  Monitor for areas of redness and/or skin breakdown2.  Assess vascular access sites hourly3.  Every 4-6 hours minimum:  Change oxygen saturation probe site4.  Every 4-6 hours:  If on nasal continuous positive airway pressure, respiratory therapy assess nares and determine need for appliance change or resting period  4/15/2025 1154 by Radhika Skinner RN  Outcome: Adequate for Discharge       Problem: ABCDS Injury Assessment  Goal: Absence of physical injury  4/15/2025 1154 by Radhika Skinner RN  Outcome: Adequate for Discharge       Problem: Safety - Adult  Goal: Free from fall injury  4/15/2025 1154 by Radhika Skinner RN  Outcome: Adequate for Discharge       Problem: Pain  Goal: Verbalizes/displays adequate comfort level or baseline comfort level  4/15/2025 1154 by Radhika Skinner RN  Outcome: Adequate for Discharge       Problem: Neurosensory - Adult  Goal: Achieves stable or improved neurological status  4/15/2025 1154 by Radhika Skinner RN  Outcome: Adequate for Discharge    Problem: Cardiovascular - Adult  Goal: Maintains optimal cardiac output and hemodynamic stability  4/15/2025 1154 by Radhika Skinner RN  Outcome: Adequate for Discharge       Problem: Musculoskeletal - Adult  Goal: Return mobility to safest level of function  4/15/2025 1154 by Radhika Skinner RN  Outcome: Adequate for Discharge      Goal: Maintain proper alignment of affected body part  4/15/2025 1154 by Radhika Skinner RN  Outcome: Adequate for

## 2025-04-15 NOTE — CARE COORDINATION
Jen TOMMY Rdz  [id number]  meets criteria for hypertension education. The following resources and interventions were provided: Flyer Education and Hypertension Flyer education

## 2025-06-01 ENCOUNTER — HOSPITAL ENCOUNTER (EMERGENCY)
Age: 60
Discharge: HOME | End: 2025-06-01
Attending: EMERGENCY MEDICINE
Payer: MEDICARE

## 2025-06-01 VITALS
TEMPERATURE: 97.7 F | RESPIRATION RATE: 18 BRPM | SYSTOLIC BLOOD PRESSURE: 107 MMHG | OXYGEN SATURATION: 99 % | HEART RATE: 78 BPM | DIASTOLIC BLOOD PRESSURE: 68 MMHG

## 2025-06-01 DIAGNOSIS — M79.672 LEFT FOOT PAIN: Primary | ICD-10-CM

## 2025-06-01 PROCEDURE — 6370000000 HC RX 637 (ALT 250 FOR IP)

## 2025-06-01 PROCEDURE — 99283 EMERGENCY DEPT VISIT LOW MDM: CPT

## 2025-06-01 RX ORDER — IBUPROFEN 600 MG/1
600 TABLET, FILM COATED ORAL 4 TIMES DAILY PRN
Qty: 20 TABLET | Refills: 0 | Status: SHIPPED | OUTPATIENT
Start: 2025-06-01 | End: 2025-06-06

## 2025-06-01 RX ORDER — PREDNISONE 10 MG/1
TABLET ORAL
Qty: 20 TABLET | Refills: 0 | Status: SHIPPED | OUTPATIENT
Start: 2025-06-01 | End: 2025-06-11

## 2025-06-01 RX ORDER — IBUPROFEN 800 MG/1
800 TABLET, FILM COATED ORAL ONCE
Status: COMPLETED | OUTPATIENT
Start: 2025-06-01 | End: 2025-06-01

## 2025-06-01 RX ORDER — PREDNISONE 20 MG/1
60 TABLET ORAL ONCE
Status: COMPLETED | OUTPATIENT
Start: 2025-06-01 | End: 2025-06-01

## 2025-06-01 RX ADMIN — PREDNISONE 60 MG: 20 TABLET ORAL at 14:50

## 2025-06-01 RX ADMIN — IBUPROFEN 800 MG: 800 TABLET, FILM COATED ORAL at 14:50

## 2025-06-01 ASSESSMENT — PAIN SCALES - GENERAL: PAINLEVEL_OUTOF10: 7

## 2025-06-01 NOTE — ED PROVIDER NOTES
[facility] EMERGENCY DEPARTMENT  Emergency Department Encounter  Emergency Medicine Resident     Pt Name:Jen Rdz  MRN: [id number]  Birthdate 1965  Date of evaluation: 6/1/25  PCP:  Cora Arechiga APRN - CNP  Note Started: 2:09 PM EDT      CHIEF COMPLAINT       Chief Complaint   Patient presents with    Foot Pain     left       HISTORY OF PRESENT ILLNESS  (Location/Symptom, Timing/Onset, Context/Setting, Quality, Duration, Modifying Factors, Severity.)      Jen Rdz is a 59 y.o. female who presents with left foot pain and swelling.  Patient states that this has been ongoing for the past few days.  Patient states that this feels just like when she had gout a month ago.  Denies any trauma to her lower extremity.  States that she has been compliant with her warfarin.    PAST MEDICAL / SURGICAL / SOCIAL / FAMILY HISTORY      has a past medical history of CHF (congestive heart failure) ([facility]), History of DVT (deep vein thrombosis), Hypertension, NICM (nonischemic cardiomyopathy) ([facility]), and Noncompliance.       has a past surgical history that includes Cardiac catheterization (01/01/2008) and Cardiac defibrillator placement.      Social History     Socioeconomic History    Marital status: Single     Spouse name: Not on file    Number of children: 3    Years of education: Not on file    Highest education level: Not on file   Occupational History    Not on file   Tobacco Use    Smoking status: Former     Current packs/day: 1.50     Types: Cigarettes    Smokeless tobacco: Never   Vaping Use    Vaping status: Never Used   Substance and Sexual Activity    Alcohol use: No    Drug use: No    Sexual activity: Not on file   Other Topics Concern    Not on file   Social History Narrative    Not on file     Social Drivers of Health     Financial Resource Strain: Low Risk  (2/21/2025)    Received from [facility]    Overall Financial Resource Strain (CARDIA)     Difficulty of Paying Living

## 2025-06-01 NOTE — ED NOTES
Pt to ed via wc to room with complaints of ongoing left foot pain  Pt states she has hx of gout  Pt states she was seen at another facility but still not having any relief

## 2025-06-01 NOTE — DISCHARGE INSTRUCTIONS
You are seen in the emerged part for your gout flare.  You are prescribed steroids and Motrin.  Please take as prescribed.  Please call your PCP tomorrow for further evaluation of the gout and they can likely put you on medications to prevent further flares.    PLEASE RETURN TO THE EMERGENCY DEPARTMENT IMMEDIATELY for worsening symptoms, pain not controlled with the prescribed / over the counter pain medication, numbness or tingling in your feet or toes, increased swelling to your toes, or if you develop any concerning symptoms such as: high fever not relieved by acetaminophen (Tylenol) and/or ibuprofen (Motrin / Advil), chills, shortness of breath, chest pain, feeling of your heart fluttering or racing, persistent nausea and/or vomiting, vomiting up blood, blood in your stool, numbness, loss of consciousness, weakness or tingling in the arms or legs or change in color of the extremities, changes in mental status, persistent headache, blurry vision, loss of bladder / bowel control, unable to follow up with your physician, or other any other care or concern.

## 2025-06-06 NOTE — ED PROVIDER NOTES
[facility]     Emergency Department     Faculty Note/ Attestation      Pt Name: Jen Rdz                                       MRN: [id number]  Birthdate 1965  Date of evaluation: 6/1/2025    Patients PCP:    Cora Arechiga APRN - CNP    Note Started: 10:54 PM EDT      Attestation  I performed a history and physical examination of the patient and discussed management with the resident. I reviewed the resident’s note and agree with the documented findings and plan of care. Any areas of disagreement are noted on the chart. I was personally present for the key portions of any procedures. I have documented in the chart those procedures where I was not present during the key portions. I have reviewed the emergency nurses triage note. I agree with the chief complaint, past medical history, past surgical history, allergies, medications, social and family history as documented unless otherwise noted below.    For Physician Assistant/ Nurse Practitioner cases/documentation I have personally evaluated this patient and have completed at least one if not all key elements of the E/M (history, physical exam, and MDM). Additional findings are as noted.      Initial Screens:        Lois Coma Scale  Eye Opening: Spontaneous  Best Verbal Response: Oriented  Best Motor Response: Obeys commands  Lois Coma Scale Score: 15    Vitals:    Vitals:    06/01/25 1339 06/01/25 1340   BP:  107/68   Pulse: 78    Resp: 18    Temp: 97.7 °F (36.5 °C)    TempSrc: Oral    SpO2: 99%        CHIEF COMPLAINT       Chief Complaint   Patient presents with    Foot Pain     left             DIAGNOSTIC RESULTS             RADIOLOGY:   No orders to display         LABS:  Labs Reviewed - No data to display      EMERGENCY DEPARTMENT COURSE:     -------------------------  BP: 107/68, Temp: 97.7 °F (36.5 °C), Pulse: 78, Respirations: 18      Comments    L foot pain c/w prior gout episodes  Will treat and have f/u with

## 2025-08-09 ENCOUNTER — APPOINTMENT (OUTPATIENT)
Dept: GENERAL RADIOLOGY | Age: 60
DRG: 291 | End: 2025-08-09
Payer: MEDICARE

## 2025-08-09 ENCOUNTER — HOSPITAL ENCOUNTER (INPATIENT)
Age: 60
LOS: 2 days | Discharge: HOME | DRG: 291 | End: 2025-08-11
Attending: EMERGENCY MEDICINE | Admitting: FAMILY MEDICINE
Payer: MEDICARE

## 2025-08-09 DIAGNOSIS — I50.9 ACUTE ON CHRONIC CONGESTIVE HEART FAILURE, UNSPECIFIED HEART FAILURE TYPE (HCC): Primary | ICD-10-CM

## 2025-08-09 PROBLEM — I48.0 PAF (PAROXYSMAL ATRIAL FIBRILLATION) (HCC): Status: ACTIVE | Noted: 2025-08-09

## 2025-08-09 PROBLEM — D68.69 SECONDARY HYPERCOAGULABLE STATE: Status: ACTIVE | Noted: 2025-08-09

## 2025-08-09 PROBLEM — I50.23 ACUTE ON CHRONIC SYSTOLIC (CONGESTIVE) HEART FAILURE (HCC): Status: ACTIVE | Noted: 2025-08-09

## 2025-08-09 LAB
ALBUMIN SERPL-MCNC: 4.3 G/DL (ref 3.5–5.2)
ALBUMIN/GLOB SERPL: 1.1 {RATIO} (ref 1–2.5)
ALP SERPL-CCNC: 92 U/L (ref 35–104)
ALT SERPL-CCNC: 14 U/L (ref 10–35)
ANION GAP SERPL CALCULATED.3IONS-SCNC: 12 MMOL/L (ref 9–16)
AST SERPL-CCNC: 22 U/L (ref 10–35)
BASOPHILS # BLD: 0.07 K/UL (ref 0–0.2)
BASOPHILS NFR BLD: 1 % (ref 0–2)
BILIRUB SERPL-MCNC: 0.6 MG/DL (ref 0–1.2)
BNP SERPL-MCNC: 2182 PG/ML (ref 0–125)
BUN SERPL-MCNC: 19 MG/DL (ref 6–20)
CALCIUM SERPL-MCNC: 10.1 MG/DL (ref 8.6–10.4)
CHLORIDE SERPL-SCNC: 102 MMOL/L (ref 98–107)
CO2 SERPL-SCNC: 26 MMOL/L (ref 20–31)
CREAT SERPL-MCNC: 1.1 MG/DL (ref 0.5–0.9)
D DIMER PPP FEU-MCNC: <0.27 UG/ML FEU (ref 0–0.59)
EOSINOPHIL # BLD: 0.56 K/UL (ref 0–0.44)
EOSINOPHILS RELATIVE PERCENT: 10 % (ref 1–4)
ERYTHROCYTE [DISTWIDTH] IN BLOOD BY AUTOMATED COUNT: 15.2 % (ref 11.8–14.4)
GFR, ESTIMATED: 56 ML/MIN/1.73M2
GLUCOSE SERPL-MCNC: 106 MG/DL (ref 74–99)
HCT VFR BLD AUTO: 42.1 % (ref 36.3–47.1)
HGB BLD-MCNC: 14 G/DL (ref 11.9–15.1)
IMM GRANULOCYTES # BLD AUTO: 0.01 K/UL (ref 0–0.3)
IMM GRANULOCYTES NFR BLD: 0 %
INR PPP: 1.9
LYMPHOCYTES NFR BLD: 1.85 K/UL (ref 1.1–3.7)
LYMPHOCYTES RELATIVE PERCENT: 32 % (ref 24–43)
MCH RBC QN AUTO: 29.8 PG (ref 25.2–33.5)
MCHC RBC AUTO-ENTMCNC: 33.3 G/DL (ref 28.4–34.8)
MCV RBC AUTO: 89.6 FL (ref 82.6–102.9)
MONOCYTES NFR BLD: 0.43 K/UL (ref 0.1–1.2)
MONOCYTES NFR BLD: 8 % (ref 3–12)
NEUTROPHILS NFR BLD: 49 % (ref 36–65)
NEUTS SEG NFR BLD: 2.85 K/UL (ref 1.5–8.1)
NRBC BLD-RTO: 0 PER 100 WBC
PLATELET # BLD AUTO: 214 K/UL (ref 138–453)
PMV BLD AUTO: 10.9 FL (ref 8.1–13.5)
POTASSIUM SERPL-SCNC: 3.8 MMOL/L (ref 3.7–5.3)
PROT SERPL-MCNC: 8.1 G/DL (ref 6.6–8.7)
PROTHROMBIN TIME: 22.6 SEC (ref 11.5–14.2)
RBC # BLD AUTO: 4.7 M/UL (ref 3.95–5.11)
RBC # BLD: ABNORMAL 10*6/UL
SODIUM SERPL-SCNC: 141 MMOL/L (ref 136–145)
TROPONIN I SERPL HS-MCNC: 9 NG/L (ref 0–14)
WBC OTHER # BLD: 5.8 K/UL (ref 3.5–11.3)

## 2025-08-09 PROCEDURE — 99285 EMERGENCY DEPT VISIT HI MDM: CPT

## 2025-08-09 PROCEDURE — 83880 ASSAY OF NATRIURETIC PEPTIDE: CPT

## 2025-08-09 PROCEDURE — 6360000002 HC RX W HCPCS: Performed by: EMERGENCY MEDICINE

## 2025-08-09 PROCEDURE — 80053 COMPREHEN METABOLIC PANEL: CPT

## 2025-08-09 PROCEDURE — 96374 THER/PROPH/DIAG INJ IV PUSH: CPT

## 2025-08-09 PROCEDURE — 99223 1ST HOSP IP/OBS HIGH 75: CPT | Performed by: NURSE PRACTITIONER

## 2025-08-09 PROCEDURE — 85610 PROTHROMBIN TIME: CPT

## 2025-08-09 PROCEDURE — 71046 X-RAY EXAM CHEST 2 VIEWS: CPT

## 2025-08-09 PROCEDURE — 85025 COMPLETE CBC W/AUTO DIFF WBC: CPT

## 2025-08-09 PROCEDURE — 84484 ASSAY OF TROPONIN QUANT: CPT

## 2025-08-09 PROCEDURE — 85379 FIBRIN DEGRADATION QUANT: CPT

## 2025-08-09 PROCEDURE — 2060000000 HC ICU INTERMEDIATE R&B

## 2025-08-09 PROCEDURE — 36415 COLL VENOUS BLD VENIPUNCTURE: CPT

## 2025-08-09 PROCEDURE — 93005 ELECTROCARDIOGRAM TRACING: CPT | Performed by: EMERGENCY MEDICINE

## 2025-08-09 PROCEDURE — 2500000003 HC RX 250 WO HCPCS: Performed by: NURSE PRACTITIONER

## 2025-08-09 RX ORDER — FUROSEMIDE 10 MG/ML
40 INJECTION INTRAMUSCULAR; INTRAVENOUS ONCE
Status: COMPLETED | OUTPATIENT
Start: 2025-08-09 | End: 2025-08-09

## 2025-08-09 RX ORDER — SPIRONOLACTONE 25 MG/1
25 TABLET ORAL DAILY
Status: DISCONTINUED | OUTPATIENT
Start: 2025-08-10 | End: 2025-08-11 | Stop reason: HOSPADM

## 2025-08-09 RX ORDER — POTASSIUM CHLORIDE 7.45 MG/ML
10 INJECTION INTRAVENOUS PRN
Status: DISCONTINUED | OUTPATIENT
Start: 2025-08-09 | End: 2025-08-11 | Stop reason: HOSPADM

## 2025-08-09 RX ORDER — MAGNESIUM SULFATE IN WATER 40 MG/ML
2000 INJECTION, SOLUTION INTRAVENOUS PRN
Status: DISCONTINUED | OUTPATIENT
Start: 2025-08-09 | End: 2025-08-11 | Stop reason: HOSPADM

## 2025-08-09 RX ORDER — ONDANSETRON 2 MG/ML
4 INJECTION INTRAMUSCULAR; INTRAVENOUS EVERY 6 HOURS PRN
Status: DISCONTINUED | OUTPATIENT
Start: 2025-08-09 | End: 2025-08-11 | Stop reason: HOSPADM

## 2025-08-09 RX ORDER — CARVEDILOL 25 MG/1
25 TABLET ORAL 2 TIMES DAILY WITH MEALS
Status: DISCONTINUED | OUTPATIENT
Start: 2025-08-10 | End: 2025-08-11 | Stop reason: HOSPADM

## 2025-08-09 RX ORDER — PRAVASTATIN SODIUM 10 MG
10 TABLET ORAL DAILY
Status: DISCONTINUED | OUTPATIENT
Start: 2025-08-10 | End: 2025-08-09

## 2025-08-09 RX ORDER — SODIUM CHLORIDE 0.9 % (FLUSH) 0.9 %
10 SYRINGE (ML) INJECTION PRN
Status: DISCONTINUED | OUTPATIENT
Start: 2025-08-09 | End: 2025-08-11 | Stop reason: HOSPADM

## 2025-08-09 RX ORDER — FUROSEMIDE 10 MG/ML
40 INJECTION INTRAMUSCULAR; INTRAVENOUS 2 TIMES DAILY
Status: DISCONTINUED | OUTPATIENT
Start: 2025-08-10 | End: 2025-08-11 | Stop reason: HOSPADM

## 2025-08-09 RX ORDER — SODIUM CHLORIDE 0.9 % (FLUSH) 0.9 %
5-40 SYRINGE (ML) INJECTION EVERY 12 HOURS SCHEDULED
Status: DISCONTINUED | OUTPATIENT
Start: 2025-08-09 | End: 2025-08-11 | Stop reason: HOSPADM

## 2025-08-09 RX ORDER — SODIUM CHLORIDE 9 MG/ML
INJECTION, SOLUTION INTRAVENOUS PRN
Status: DISCONTINUED | OUTPATIENT
Start: 2025-08-09 | End: 2025-08-11 | Stop reason: HOSPADM

## 2025-08-09 RX ORDER — ONDANSETRON 4 MG/1
4 TABLET, ORALLY DISINTEGRATING ORAL EVERY 8 HOURS PRN
Status: DISCONTINUED | OUTPATIENT
Start: 2025-08-09 | End: 2025-08-11 | Stop reason: HOSPADM

## 2025-08-09 RX ORDER — ASPIRIN 81 MG/1
81 TABLET, CHEWABLE ORAL DAILY
Status: DISCONTINUED | OUTPATIENT
Start: 2025-08-10 | End: 2025-08-11 | Stop reason: HOSPADM

## 2025-08-09 RX ORDER — ACETAMINOPHEN 650 MG/1
650 SUPPOSITORY RECTAL EVERY 6 HOURS PRN
Status: DISCONTINUED | OUTPATIENT
Start: 2025-08-09 | End: 2025-08-11 | Stop reason: HOSPADM

## 2025-08-09 RX ORDER — POTASSIUM CHLORIDE 1500 MG/1
40 TABLET, EXTENDED RELEASE ORAL PRN
Status: DISCONTINUED | OUTPATIENT
Start: 2025-08-09 | End: 2025-08-11 | Stop reason: HOSPADM

## 2025-08-09 RX ORDER — ACETAMINOPHEN 325 MG/1
650 TABLET ORAL EVERY 6 HOURS PRN
Status: DISCONTINUED | OUTPATIENT
Start: 2025-08-09 | End: 2025-08-11 | Stop reason: HOSPADM

## 2025-08-09 RX ORDER — POLYETHYLENE GLYCOL 3350 17 G/17G
17 POWDER, FOR SOLUTION ORAL DAILY PRN
Status: DISCONTINUED | OUTPATIENT
Start: 2025-08-09 | End: 2025-08-11 | Stop reason: HOSPADM

## 2025-08-09 RX ORDER — FERROUS SULFATE 325(65) MG
325 TABLET, DELAYED RELEASE (ENTERIC COATED) ORAL
Status: DISCONTINUED | OUTPATIENT
Start: 2025-08-10 | End: 2025-08-11 | Stop reason: HOSPADM

## 2025-08-09 RX ADMIN — SODIUM CHLORIDE, PRESERVATIVE FREE 10 ML: 5 INJECTION INTRAVENOUS at 21:14

## 2025-08-09 RX ADMIN — FUROSEMIDE 40 MG: 10 INJECTION, SOLUTION INTRAMUSCULAR; INTRAVENOUS at 18:38

## 2025-08-09 ASSESSMENT — PAIN - FUNCTIONAL ASSESSMENT: PAIN_FUNCTIONAL_ASSESSMENT: 0-10

## 2025-08-09 ASSESSMENT — LIFESTYLE VARIABLES: HOW OFTEN DO YOU HAVE A DRINK CONTAINING ALCOHOL: NEVER

## 2025-08-09 ASSESSMENT — PAIN SCALES - GENERAL
PAINLEVEL_OUTOF10: 0
PAINLEVEL_OUTOF10: 0

## 2025-08-10 LAB
ANION GAP SERPL CALCULATED.3IONS-SCNC: 13 MMOL/L (ref 9–16)
BASOPHILS # BLD: 0.07 K/UL (ref 0–0.2)
BASOPHILS NFR BLD: 1 % (ref 0–2)
BUN SERPL-MCNC: 24 MG/DL (ref 6–20)
CALCIUM SERPL-MCNC: 9.6 MG/DL (ref 8.6–10.4)
CHLORIDE SERPL-SCNC: 100 MMOL/L (ref 98–107)
CO2 SERPL-SCNC: 27 MMOL/L (ref 20–31)
CREAT SERPL-MCNC: 1 MG/DL (ref 0.5–0.9)
EOSINOPHIL # BLD: 0.53 K/UL (ref 0–0.44)
EOSINOPHILS RELATIVE PERCENT: 9 % (ref 1–4)
ERYTHROCYTE [DISTWIDTH] IN BLOOD BY AUTOMATED COUNT: 14.9 % (ref 11.8–14.4)
GFR, ESTIMATED: 64 ML/MIN/1.73M2
GLUCOSE SERPL-MCNC: 100 MG/DL (ref 74–99)
HCT VFR BLD AUTO: 37.2 % (ref 36.3–47.1)
HGB BLD-MCNC: 12.5 G/DL (ref 11.9–15.1)
IMM GRANULOCYTES # BLD AUTO: 0.01 K/UL (ref 0–0.3)
IMM GRANULOCYTES NFR BLD: 0 %
INR PPP: 2.2
LYMPHOCYTES NFR BLD: 1.81 K/UL (ref 1.1–3.7)
LYMPHOCYTES RELATIVE PERCENT: 31 % (ref 24–43)
MAGNESIUM SERPL-MCNC: 1.8 MG/DL (ref 1.6–2.6)
MCH RBC QN AUTO: 29.9 PG (ref 25.2–33.5)
MCHC RBC AUTO-ENTMCNC: 33.6 G/DL (ref 28.4–34.8)
MCV RBC AUTO: 89 FL (ref 82.6–102.9)
MONOCYTES NFR BLD: 0.52 K/UL (ref 0.1–1.2)
MONOCYTES NFR BLD: 9 % (ref 3–12)
NEUTROPHILS NFR BLD: 50 % (ref 36–65)
NEUTS SEG NFR BLD: 2.98 K/UL (ref 1.5–8.1)
NRBC BLD-RTO: 0 PER 100 WBC
PLATELET # BLD AUTO: 187 K/UL (ref 138–453)
PMV BLD AUTO: 10.9 FL (ref 8.1–13.5)
POTASSIUM SERPL-SCNC: 3.6 MMOL/L (ref 3.7–5.3)
PROTHROMBIN TIME: 25.2 SEC (ref 11.5–14.2)
RBC # BLD AUTO: 4.18 M/UL (ref 3.95–5.11)
RBC # BLD: ABNORMAL 10*6/UL
SODIUM SERPL-SCNC: 140 MMOL/L (ref 136–145)
TSH SERPL DL<=0.05 MIU/L-ACNC: 1.13 UIU/ML (ref 0.27–4.2)
WBC OTHER # BLD: 5.9 K/UL (ref 3.5–11.3)

## 2025-08-10 PROCEDURE — 2060000000 HC ICU INTERMEDIATE R&B

## 2025-08-10 PROCEDURE — 6360000002 HC RX W HCPCS: Performed by: NURSE PRACTITIONER

## 2025-08-10 PROCEDURE — 80048 BASIC METABOLIC PNL TOTAL CA: CPT

## 2025-08-10 PROCEDURE — 6370000000 HC RX 637 (ALT 250 FOR IP): Performed by: NURSE PRACTITIONER

## 2025-08-10 PROCEDURE — 99232 SBSQ HOSP IP/OBS MODERATE 35: CPT | Performed by: FAMILY MEDICINE

## 2025-08-10 PROCEDURE — 2700000000 HC OXYGEN THERAPY PER DAY

## 2025-08-10 PROCEDURE — 85610 PROTHROMBIN TIME: CPT

## 2025-08-10 PROCEDURE — 94761 N-INVAS EAR/PLS OXIMETRY MLT: CPT

## 2025-08-10 PROCEDURE — 2500000003 HC RX 250 WO HCPCS: Performed by: NURSE PRACTITIONER

## 2025-08-10 PROCEDURE — 36415 COLL VENOUS BLD VENIPUNCTURE: CPT

## 2025-08-10 PROCEDURE — 83735 ASSAY OF MAGNESIUM: CPT

## 2025-08-10 PROCEDURE — 85025 COMPLETE CBC W/AUTO DIFF WBC: CPT

## 2025-08-10 PROCEDURE — 84443 ASSAY THYROID STIM HORMONE: CPT

## 2025-08-10 RX ORDER — WARFARIN SODIUM 5 MG/1
5 TABLET ORAL
Status: COMPLETED | OUTPATIENT
Start: 2025-08-10 | End: 2025-08-10

## 2025-08-10 RX ADMIN — FUROSEMIDE 40 MG: 10 INJECTION, SOLUTION INTRAMUSCULAR; INTRAVENOUS at 17:40

## 2025-08-10 RX ADMIN — CARVEDILOL 25 MG: 25 TABLET, FILM COATED ORAL at 07:36

## 2025-08-10 RX ADMIN — WARFARIN SODIUM 5 MG: 5 TABLET ORAL at 17:40

## 2025-08-10 RX ADMIN — FERROUS SULFATE TAB EC 325 MG (65 MG FE EQUIVALENT) 325 MG: 325 (65 FE) TABLET DELAYED RESPONSE at 07:36

## 2025-08-10 RX ADMIN — SODIUM CHLORIDE, PRESERVATIVE FREE 10 ML: 5 INJECTION INTRAVENOUS at 07:41

## 2025-08-10 RX ADMIN — CARVEDILOL 25 MG: 25 TABLET, FILM COATED ORAL at 17:40

## 2025-08-10 RX ADMIN — SODIUM CHLORIDE, PRESERVATIVE FREE 10 ML: 5 INJECTION INTRAVENOUS at 21:41

## 2025-08-10 RX ADMIN — FUROSEMIDE 40 MG: 10 INJECTION, SOLUTION INTRAMUSCULAR; INTRAVENOUS at 07:36

## 2025-08-10 RX ADMIN — SPIRONOLACTONE 25 MG: 25 TABLET ORAL at 07:36

## 2025-08-10 RX ADMIN — ASPIRIN 81 MG: 81 TABLET, CHEWABLE ORAL at 07:36

## 2025-08-10 ASSESSMENT — PAIN SCALES - GENERAL
PAINLEVEL_OUTOF10: 0

## 2025-08-11 VITALS
RESPIRATION RATE: 16 BRPM | SYSTOLIC BLOOD PRESSURE: 100 MMHG | OXYGEN SATURATION: 97 % | HEIGHT: 63 IN | BODY MASS INDEX: 36.52 KG/M2 | WEIGHT: 206.13 LBS | TEMPERATURE: 97.6 F | DIASTOLIC BLOOD PRESSURE: 74 MMHG | HEART RATE: 73 BPM

## 2025-08-11 LAB
ANION GAP SERPL CALCULATED.3IONS-SCNC: 14 MMOL/L (ref 9–16)
BUN SERPL-MCNC: 30 MG/DL (ref 6–20)
CALCIUM SERPL-MCNC: 9.7 MG/DL (ref 8.6–10.4)
CHLORIDE SERPL-SCNC: 97 MMOL/L (ref 98–107)
CO2 SERPL-SCNC: 27 MMOL/L (ref 20–31)
CREAT SERPL-MCNC: 1 MG/DL (ref 0.5–0.9)
EKG ATRIAL RATE: 75 BPM
EKG P AXIS: 38 DEGREES
EKG P-R INTERVAL: 156 MS
EKG Q-T INTERVAL: 450 MS
EKG QRS DURATION: 152 MS
EKG QTC CALCULATION (BAZETT): 502 MS
EKG R AXIS: -29 DEGREES
EKG T AXIS: 110 DEGREES
EKG VENTRICULAR RATE: 75 BPM
GFR, ESTIMATED: 62 ML/MIN/1.73M2
GLUCOSE SERPL-MCNC: 106 MG/DL (ref 74–99)
INR PPP: 2.1
MAGNESIUM SERPL-MCNC: 1.9 MG/DL (ref 1.6–2.6)
POTASSIUM SERPL-SCNC: 3.7 MMOL/L (ref 3.7–5.3)
PROTHROMBIN TIME: 24.6 SEC (ref 11.5–14.2)
SODIUM SERPL-SCNC: 138 MMOL/L (ref 136–145)

## 2025-08-11 PROCEDURE — 80048 BASIC METABOLIC PNL TOTAL CA: CPT

## 2025-08-11 PROCEDURE — 93010 ELECTROCARDIOGRAM REPORT: CPT | Performed by: INTERNAL MEDICINE

## 2025-08-11 PROCEDURE — 2500000003 HC RX 250 WO HCPCS: Performed by: NURSE PRACTITIONER

## 2025-08-11 PROCEDURE — 6370000000 HC RX 637 (ALT 250 FOR IP): Performed by: NURSE PRACTITIONER

## 2025-08-11 PROCEDURE — 99239 HOSP IP/OBS DSCHRG MGMT >30: CPT | Performed by: FAMILY MEDICINE

## 2025-08-11 PROCEDURE — 36415 COLL VENOUS BLD VENIPUNCTURE: CPT

## 2025-08-11 PROCEDURE — 6360000002 HC RX W HCPCS: Performed by: NURSE PRACTITIONER

## 2025-08-11 PROCEDURE — 85610 PROTHROMBIN TIME: CPT

## 2025-08-11 PROCEDURE — 83735 ASSAY OF MAGNESIUM: CPT

## 2025-08-11 PROCEDURE — 2700000000 HC OXYGEN THERAPY PER DAY

## 2025-08-11 RX ORDER — ALBUTEROL SULFATE 90 UG/1
2 INHALANT RESPIRATORY (INHALATION) EVERY 6 HOURS PRN
COMMUNITY

## 2025-08-11 RX ORDER — TIOTROPIUM BROMIDE 18 UG/1
18 CAPSULE ORAL; RESPIRATORY (INHALATION) DAILY
COMMUNITY

## 2025-08-11 RX ORDER — IBUPROFEN 600 MG/1
600 TABLET, FILM COATED ORAL EVERY 6 HOURS PRN
COMMUNITY

## 2025-08-11 RX ORDER — ACETAMINOPHEN 160 MG
2000 TABLET,DISINTEGRATING ORAL DAILY
COMMUNITY

## 2025-08-11 RX ORDER — WARFARIN SODIUM 5 MG/1
5 TABLET ORAL
Status: DISCONTINUED | OUTPATIENT
Start: 2025-08-11 | End: 2025-08-11 | Stop reason: HOSPADM

## 2025-08-11 RX ORDER — WARFARIN SODIUM 5 MG/1
5 TABLET ORAL DAILY
COMMUNITY

## 2025-08-11 RX ORDER — PRAVASTATIN SODIUM 10 MG
10 TABLET ORAL DAILY
Qty: 30 TABLET | Refills: 3 | Status: SHIPPED | OUTPATIENT
Start: 2025-08-11

## 2025-08-11 RX ADMIN — ASPIRIN 81 MG: 81 TABLET, CHEWABLE ORAL at 08:31

## 2025-08-11 RX ADMIN — CARVEDILOL 25 MG: 25 TABLET, FILM COATED ORAL at 08:30

## 2025-08-11 RX ADMIN — FUROSEMIDE 40 MG: 10 INJECTION, SOLUTION INTRAMUSCULAR; INTRAVENOUS at 08:31

## 2025-08-11 RX ADMIN — FERROUS SULFATE TAB EC 325 MG (65 MG FE EQUIVALENT) 325 MG: 325 (65 FE) TABLET DELAYED RESPONSE at 08:31

## 2025-08-11 RX ADMIN — SPIRONOLACTONE 25 MG: 25 TABLET ORAL at 08:31

## 2025-08-11 RX ADMIN — SODIUM CHLORIDE, PRESERVATIVE FREE 10 ML: 5 INJECTION INTRAVENOUS at 08:36

## 2025-08-11 ASSESSMENT — ENCOUNTER SYMPTOMS
SHORTNESS OF BREATH: 0
COUGH: 0
DIARRHEA: 0
BLOOD IN STOOL: 0
RHINORRHEA: 0
CONSTIPATION: 0
VOMITING: 0
NAUSEA: 0
ABDOMINAL PAIN: 0
CHEST TIGHTNESS: 0
WHEEZING: 0

## 2025-08-11 ASSESSMENT — PAIN SCALES - GENERAL
PAINLEVEL_OUTOF10: 0
PAINLEVEL_OUTOF10: 0

## 2025-08-13 ENCOUNTER — TELEPHONE (OUTPATIENT)
Age: 60
End: 2025-08-13

## 2025-08-19 ENCOUNTER — TELEPHONE (OUTPATIENT)
Dept: PHARMACY | Facility: CLINIC | Age: 60
End: 2025-08-19